# Patient Record
Sex: MALE | Race: WHITE | NOT HISPANIC OR LATINO | Employment: UNEMPLOYED | URBAN - METROPOLITAN AREA
[De-identification: names, ages, dates, MRNs, and addresses within clinical notes are randomized per-mention and may not be internally consistent; named-entity substitution may affect disease eponyms.]

---

## 2017-10-28 ENCOUNTER — HOSPITAL ENCOUNTER (EMERGENCY)
Facility: MEDICAL CENTER | Age: 57
End: 2017-10-28
Attending: EMERGENCY MEDICINE
Payer: MEDICAID

## 2017-10-28 ENCOUNTER — APPOINTMENT (OUTPATIENT)
Dept: RADIOLOGY | Facility: MEDICAL CENTER | Age: 57
End: 2017-10-28
Attending: EMERGENCY MEDICINE
Payer: MEDICAID

## 2017-10-28 VITALS
HEART RATE: 66 BPM | DIASTOLIC BLOOD PRESSURE: 101 MMHG | WEIGHT: 244.49 LBS | HEIGHT: 75 IN | RESPIRATION RATE: 20 BRPM | OXYGEN SATURATION: 93 % | SYSTOLIC BLOOD PRESSURE: 121 MMHG | BODY MASS INDEX: 30.4 KG/M2 | TEMPERATURE: 98.9 F

## 2017-10-28 DIAGNOSIS — R10.32 LEFT INGUINAL PAIN: ICD-10-CM

## 2017-10-28 DIAGNOSIS — R10.9 FLANK PAIN: ICD-10-CM

## 2017-10-28 LAB
ALBUMIN SERPL BCP-MCNC: 4.1 G/DL (ref 3.2–4.9)
ALBUMIN/GLOB SERPL: 1.3 G/DL
ALP SERPL-CCNC: 117 U/L (ref 30–99)
ALT SERPL-CCNC: 30 U/L (ref 2–50)
ANION GAP SERPL CALC-SCNC: 6 MMOL/L (ref 0–11.9)
APPEARANCE UR: CLEAR
AST SERPL-CCNC: 34 U/L (ref 12–45)
BACTERIA #/AREA URNS HPF: NEGATIVE /HPF
BASOPHILS # BLD AUTO: 1.1 % (ref 0–1.8)
BASOPHILS # BLD: 0.06 K/UL (ref 0–0.12)
BILIRUB SERPL-MCNC: 0.4 MG/DL (ref 0.1–1.5)
BILIRUB UR QL STRIP.AUTO: NEGATIVE
BUN SERPL-MCNC: 13 MG/DL (ref 8–22)
CALCIUM SERPL-MCNC: 9.1 MG/DL (ref 8.5–10.5)
CHLORIDE SERPL-SCNC: 105 MMOL/L (ref 96–112)
CO2 SERPL-SCNC: 24 MMOL/L (ref 20–33)
COLOR UR: YELLOW
CREAT SERPL-MCNC: 0.93 MG/DL (ref 0.5–1.4)
CULTURE IF INDICATED INDCX: NO UA CULTURE
EOSINOPHIL # BLD AUTO: 0.18 K/UL (ref 0–0.51)
EOSINOPHIL NFR BLD: 3.4 % (ref 0–6.9)
EPI CELLS #/AREA URNS HPF: NEGATIVE /HPF
ERYTHROCYTE [DISTWIDTH] IN BLOOD BY AUTOMATED COUNT: 44.5 FL (ref 35.9–50)
GFR SERPL CREATININE-BSD FRML MDRD: >60 ML/MIN/1.73 M 2
GLOBULIN SER CALC-MCNC: 3.1 G/DL (ref 1.9–3.5)
GLUCOSE SERPL-MCNC: 124 MG/DL (ref 65–99)
GLUCOSE UR STRIP.AUTO-MCNC: NEGATIVE MG/DL
HCT VFR BLD AUTO: 49.2 % (ref 42–52)
HGB BLD-MCNC: 16.8 G/DL (ref 14–18)
HYALINE CASTS #/AREA URNS LPF: ABNORMAL /LPF
IMM GRANULOCYTES # BLD AUTO: 0.02 K/UL (ref 0–0.11)
IMM GRANULOCYTES NFR BLD AUTO: 0.4 % (ref 0–0.9)
KETONES UR STRIP.AUTO-MCNC: NEGATIVE MG/DL
LEUKOCYTE ESTERASE UR QL STRIP.AUTO: NEGATIVE
LIPASE SERPL-CCNC: 26 U/L (ref 11–82)
LYMPHOCYTES # BLD AUTO: 1.84 K/UL (ref 1–4.8)
LYMPHOCYTES NFR BLD: 34.4 % (ref 22–41)
MCH RBC QN AUTO: 32.2 PG (ref 27–33)
MCHC RBC AUTO-ENTMCNC: 34.1 G/DL (ref 33.7–35.3)
MCV RBC AUTO: 94.3 FL (ref 81.4–97.8)
MICRO URNS: ABNORMAL
MONOCYTES # BLD AUTO: 0.39 K/UL (ref 0–0.85)
MONOCYTES NFR BLD AUTO: 7.3 % (ref 0–13.4)
NEUTROPHILS # BLD AUTO: 2.86 K/UL (ref 1.82–7.42)
NEUTROPHILS NFR BLD: 53.4 % (ref 44–72)
NITRITE UR QL STRIP.AUTO: NEGATIVE
NRBC # BLD AUTO: 0 K/UL
NRBC BLD AUTO-RTO: 0 /100 WBC
PH UR STRIP.AUTO: 7 [PH]
PLATELET # BLD AUTO: 186 K/UL (ref 164–446)
PMV BLD AUTO: 10.2 FL (ref 9–12.9)
POTASSIUM SERPL-SCNC: 4 MMOL/L (ref 3.6–5.5)
PROT SERPL-MCNC: 7.2 G/DL (ref 6–8.2)
PROT UR QL STRIP: NEGATIVE MG/DL
RBC # BLD AUTO: 5.22 M/UL (ref 4.7–6.1)
RBC # URNS HPF: ABNORMAL /HPF
RBC UR QL AUTO: ABNORMAL
SODIUM SERPL-SCNC: 135 MMOL/L (ref 135–145)
SP GR UR STRIP.AUTO: 1.02
UROBILINOGEN UR STRIP.AUTO-MCNC: 1 MG/DL
WBC # BLD AUTO: 5.4 K/UL (ref 4.8–10.8)
WBC #/AREA URNS HPF: ABNORMAL /HPF

## 2017-10-28 PROCEDURE — 74176 CT ABD & PELVIS W/O CONTRAST: CPT

## 2017-10-28 PROCEDURE — 700105 HCHG RX REV CODE 258: Performed by: EMERGENCY MEDICINE

## 2017-10-28 PROCEDURE — 83690 ASSAY OF LIPASE: CPT

## 2017-10-28 PROCEDURE — 99284 EMERGENCY DEPT VISIT MOD MDM: CPT

## 2017-10-28 PROCEDURE — 700111 HCHG RX REV CODE 636 W/ 250 OVERRIDE (IP): Performed by: EMERGENCY MEDICINE

## 2017-10-28 PROCEDURE — 80053 COMPREHEN METABOLIC PANEL: CPT

## 2017-10-28 PROCEDURE — 96375 TX/PRO/DX INJ NEW DRUG ADDON: CPT

## 2017-10-28 PROCEDURE — 81001 URINALYSIS AUTO W/SCOPE: CPT

## 2017-10-28 PROCEDURE — 96374 THER/PROPH/DIAG INJ IV PUSH: CPT

## 2017-10-28 PROCEDURE — 85025 COMPLETE CBC W/AUTO DIFF WBC: CPT

## 2017-10-28 RX ORDER — HYDROMORPHONE HYDROCHLORIDE 2 MG/ML
1 INJECTION, SOLUTION INTRAMUSCULAR; INTRAVENOUS; SUBCUTANEOUS
Status: DISCONTINUED | OUTPATIENT
Start: 2017-10-28 | End: 2017-10-28 | Stop reason: HOSPADM

## 2017-10-28 RX ORDER — ONDANSETRON 4 MG/1
4 TABLET, ORALLY DISINTEGRATING ORAL EVERY 8 HOURS PRN
Qty: 10 TAB | Refills: 0 | Status: SHIPPED | OUTPATIENT
Start: 2017-10-28 | End: 2019-05-03 | Stop reason: CLARIF

## 2017-10-28 RX ORDER — HYDROCODONE BITARTRATE AND ACETAMINOPHEN 5; 325 MG/1; MG/1
1-2 TABLET ORAL EVERY 6 HOURS PRN
Qty: 20 TAB | Refills: 0 | Status: SHIPPED | OUTPATIENT
Start: 2017-10-28 | End: 2019-05-03 | Stop reason: CLARIF

## 2017-10-28 RX ORDER — ONDANSETRON 2 MG/ML
4 INJECTION INTRAMUSCULAR; INTRAVENOUS ONCE
Status: COMPLETED | OUTPATIENT
Start: 2017-10-28 | End: 2017-10-28

## 2017-10-28 RX ORDER — SODIUM CHLORIDE 9 MG/ML
1000 INJECTION, SOLUTION INTRAVENOUS ONCE
Status: COMPLETED | OUTPATIENT
Start: 2017-10-28 | End: 2017-10-28

## 2017-10-28 RX ADMIN — SODIUM CHLORIDE 1000 ML: 9 INJECTION, SOLUTION INTRAVENOUS at 08:58

## 2017-10-28 RX ADMIN — ONDANSETRON HYDROCHLORIDE 4 MG: 2 INJECTION, SOLUTION INTRAMUSCULAR; INTRAVENOUS at 08:58

## 2017-10-28 RX ADMIN — HYDROMORPHONE HYDROCHLORIDE 1 MG: 2 INJECTION INTRAMUSCULAR; INTRAVENOUS; SUBCUTANEOUS at 08:58

## 2017-10-28 ASSESSMENT — PAIN SCALES - GENERAL
PAINLEVEL_OUTOF10: 7
PAINLEVEL_OUTOF10: 4

## 2017-10-28 NOTE — ED NOTES
".  Chief Complaint   Patient presents with   • Back Pain     x 2 days   • Hip Pain     left hip radiates to groin. onset last night   • Urinary Frequency     x 1 day     Ambulated to triage pt reporting 10/10 pain to left hip groin area radiates to scrotum \"like I got kicked there\". Denies blood in urine.  "

## 2017-10-28 NOTE — DISCHARGE INSTRUCTIONS
Please follow-up with your primary care provider for blood pressure management.    Return to the ER in 1-2 days for recheck unless improved. Return sooner if pain is worse, fevers, vomiting.          Abdominal Pain, Adult  Many things can cause abdominal pain. Usually, abdominal pain is not caused by a disease and will improve without treatment. It can often be observed and treated at home. Your health care provider will do a physical exam and possibly order blood tests and X-rays to help determine the seriousness of your pain. However, in many cases, more time must pass before a clear cause of the pain can be found. Before that point, your health care provider may not know if you need more testing or further treatment.  HOME CARE INSTRUCTIONS   Monitor your abdominal pain for any changes. The following actions may help to alleviate any discomfort you are experiencing:  · Only take over-the-counter or prescription medicines as directed by your health care provider.  · Do not take laxatives unless directed to do so by your health care provider.  · Try a clear liquid diet (broth, tea, or water) as directed by your health care provider. Slowly move to a bland diet as tolerated.  SEEK MEDICAL CARE IF:  · You have unexplained abdominal pain.  · You have abdominal pain associated with nausea or diarrhea.  · You have pain when you urinate or have a bowel movement.  · You experience abdominal pain that wakes you in the night.  · You have abdominal pain that is worsened or improved by eating food.  · You have abdominal pain that is worsened with eating fatty foods.  · You have a fever.  SEEK IMMEDIATE MEDICAL CARE IF:   · Your pain does not go away within 2 hours.  · You keep throwing up (vomiting).  · Your pain is felt only in portions of the abdomen, such as the right side or the left lower portion of the abdomen.  · You pass bloody or black tarry stools.  MAKE SURE YOU:  · Understand these instructions.    · Will watch  your condition.    · Will get help right away if you are not doing well or get worse.       This information is not intended to replace advice given to you by your health care provider. Make sure you discuss any questions you have with your health care provider.     Document Released: 09/27/2006 Document Revised: 01/08/2016 Document Reviewed: 08/27/2014  [employer] Interactive Patient Education ©2016 [employer]

## 2017-10-28 NOTE — ED PROVIDER NOTES
"ED Provider Note    Scribed for Álvaro Bermudze M.D. by Tiffanie Linder. 10/28/2017  8:30 AM    Primary care provider: Pcp Pt States None  Means of arrival: Walk-In  History obtained from: Patient  History limited by: None    CHIEF COMPLAINT  Chief Complaint   Patient presents with   • Back Pain     x 2 days   • Hip Pain     left hip radiates to groin. onset last night   • Urinary Frequency     x 1 day       HPI  Timothy Dahl is a 56 y.o. male who presents to the Emergency Department complaining of constant back pain onset couple of days and progressively worsening left hip pain that radiates to his groin onset Saturday night. He describes this pain as feeling like he got \"kicked in the nuts.\" Per patient, when he woke up the other day it felt like he pulled his back. He had his boss crack his back without relief. Denies testicular swelling, nausea, hematuria, emesis. Denies dysuria however states \"it is an effort.\" Denies a past medical history of kidney stones, hernias.       REVIEW OF SYSTEMS  Pertinent positives include back pain, hip pain, strain with urination. Pertinent negatives include no dysuria, hematuria, testicular swelling, nausea, emesis.      PAST MEDICAL HISTORY    No past medical history on file.    SURGICAL HISTORY   has a past surgical history that includes other orthopedic surgery.    SOCIAL HISTORY  Social History   Substance Use Topics   • Smoking status: Current Every Day Smoker   • Alcohol use No      History   Drug Use No       FAMILY HISTORY  No family history on file.    CURRENT MEDICATIONS  No current facility-administered medications on file prior to encounter.      Current Outpatient Prescriptions on File Prior to Encounter   Medication Sig Dispense Refill   • methylPREDNISolone (MEDROL) 4 MG TABS 6 day course 21 Each 0       ALLERGIES  No Known Allergies    PHYSICAL EXAM  VITAL SIGNS: /101   Pulse 66   Temp 37.2 °C (98.9 °F)   Resp 20   Ht 1.905 m (6' 3\")   Wt 110.9 kg " (244 lb 7.8 oz)   SpO2 93%   BMI 30.56 kg/m²     Constitutional: Well developed, Well nourished, moderate distress, Non-toxic appearance.   HENT: Normocephalic, Atraumatic, Bilateral external ears normal, Oropharynx moist, No oral exudates.   Eyes: PERRLA, EOMI, Conjunctiva normal, No discharge.   Neck: No tenderness, Supple, No stridor.   Lymphatic: No lymphadenopathy noted.   Cardiovascular: Normal heart rate, Normal rhythm.   Thorax & Lungs: Clear to auscultation bilaterally, No respiratory distress, No wheezing, No crackles.   Abdomen: Soft, No tenderness, No masses, No pulsatile masses.   Back: Non tender.   : No palpable hernias. No soft tissue swelling or masses.   Skin: Warm, Dry, No erythema, No rash.   Extremities:, No edema No cyanosis.   Musculoskeletal: No tenderness to palpation or major deformities noted.  Intact distal pulses  Neurologic: Awake, alert. Moves all extremities spontaneously.  Psychiatric: Affect normal, Judgment normal, Mood normal.       LABS  Labs Reviewed   COMP METABOLIC PANEL - Abnormal; Notable for the following:        Result Value    Glucose 124 (*)     Alkaline Phosphatase 117 (*)     All other components within normal limits   URINALYSIS,CULTURE IF INDICATED - Abnormal; Notable for the following:     Occult Blood Trace (*)     All other components within normal limits   URINE MICROSCOPIC (W/UA) - Abnormal; Notable for the following:     WBC 0-2 (*)     RBC 5-10 (*)     All other components within normal limits   CBC WITH DIFFERENTIAL   LIPASE   ESTIMATED GFR     All labs reviewed by me.    RADIOLOGY  CT-RENAL COLIC EVALUATION(A/P W/O)   Final Result      No CT evidence of urolithiasis or hydronephrosis      Medium length segmental loop of mid small bowel is mildly dilated, 3.7 cm, without wall thickening, abrupt transition point or other explanation for dilatation. Given the mild dilatation and lack of adjacent fat stranding this may be of no significance    although  partial obstruction is not excluded      Colonic diverticulosis without CT evidence for diverticulitis      Bilateral renal cysts      Hepatic steatosis        The radiologist's interpretation of all radiological studies have been reviewed by me.    COURSE & MEDICAL DECISION MAKING  Nursing notes, SKY FERNANDEZHx reviewed in chart.    8:30 AM - Patient seen and examined at bedside. I suspect patient's symptoms are due to a kidney stone and will order CT-renal to rule out.  Patient will be treated with IV fluids, Zofran 4 mg, Dilaudid 1 mg. Ordered CT-renal, urinalysis culture, CBC with differential, CMP, lipase to evaluate his symptoms. Differentials include but are not limited to: kidney stones, inguinal hernia, UTI.    11:27 AM - Recheck. Updated patient on lab and radiology results which indicated a small amount of blood in his urine. Patient reports that his pain is still present however the medication has allowed him to rest some. This could be a possible muscle sprain or kidney stone. He will be discharged with pain medications. I advised him on narcotic use and he verbalized his understanding. Patient was given ED return precautions and discharged at this time.     The patient was informed of their blood pressure exceeding 120/80 and I have asked them to follow up with their primary care physician to discuss further monitoring and possible treatment.     Decision Making:  Patient with left groin pain of uncertain etiology could be secondary to hip strain and muscle scale the pain versus passed kidney stone. CT scan of laboratory tests are unremarkable, the patient is not having any back pain. I discussed with him at this point in time I do not know the etiology of his pain level to the patient with pain medicines, anti-inflammatories, have the patient return with increasing pain fevers vomiting or any other concerns.    I reviewed prescription monitoring program for patient's narcotic use before prescribing a  scheduled drug.The patient will not drink alcohol nor drive with prescribed medications. The patient will return for new or worsening symptoms and is stable at the time of discharge.    The patient is referred to a primary physician for blood pressure management, diabetic screening, and for all other preventative health concerns.    DISPOSITION:  Patient will be discharged home in stable condition.    FOLLOW UP:  [facility], Emergency Dept  [address]  [place] [address] [phone]    If symptoms worsen, In 1-2 days for recheck if not improved.      OUTPATIENT MEDICATIONS:  Discharge Medication List as of 10/28/2017 11:35 AM      START taking these medications    Details   hydrocodone-acetaminophen (NORCO) 5-325 MG Tab per tablet Take 1-2 Tabs by mouth every 6 hours as needed., Disp-20 Tab, R-0, Print Rx Paper      ondansetron (ZOFRAN ODT) 4 MG TABLET DISPERSIBLE Take 1 Tab by mouth every 8 hours as needed., Disp-10 Tab, R-0, Print Rx Paper               FINAL IMPRESSION  1. Flank pain    2. Left inguinal pain          Tiffanie NELSON (Ta), am scribing for, and in the presence of, Álvaro Bermudez M.D..    Electronically signed by: Tiffanie Linder (Ta), 10/28/2017    Álvaro NELSON M.D. personally performed the services described in this documentation, as scribed by Tiffanie Linder in my presence, and it is both accurate and complete.    The note accurately reflects work and decisions made by me.  Álvaro Bermudez  10/28/2017  1:44 PM

## 2019-05-03 ENCOUNTER — HOSPITAL ENCOUNTER (EMERGENCY)
Facility: MEDICAL CENTER | Age: 59
End: 2019-05-03
Attending: EMERGENCY MEDICINE
Payer: MEDICAID

## 2019-05-03 VITALS
SYSTOLIC BLOOD PRESSURE: 142 MMHG | WEIGHT: 244.93 LBS | HEIGHT: 75 IN | HEART RATE: 97 BPM | OXYGEN SATURATION: 95 % | BODY MASS INDEX: 30.45 KG/M2 | DIASTOLIC BLOOD PRESSURE: 86 MMHG | RESPIRATION RATE: 16 BRPM | TEMPERATURE: 96.9 F

## 2019-05-03 DIAGNOSIS — T14.8XXA WOUND INFECTION: ICD-10-CM

## 2019-05-03 DIAGNOSIS — L08.9 WOUND INFECTION: ICD-10-CM

## 2019-05-03 PROCEDURE — 700102 HCHG RX REV CODE 250 W/ 637 OVERRIDE(OP): Performed by: EMERGENCY MEDICINE

## 2019-05-03 PROCEDURE — 99284 EMERGENCY DEPT VISIT MOD MDM: CPT

## 2019-05-03 PROCEDURE — A9270 NON-COVERED ITEM OR SERVICE: HCPCS | Performed by: EMERGENCY MEDICINE

## 2019-05-03 PROCEDURE — 700101 HCHG RX REV CODE 250: Performed by: EMERGENCY MEDICINE

## 2019-05-03 RX ORDER — SULFAMETHOXAZOLE AND TRIMETHOPRIM 800; 160 MG/1; MG/1
1 TABLET ORAL 2 TIMES DAILY
Qty: 20 TAB | Refills: 0 | Status: SHIPPED | OUTPATIENT
Start: 2019-05-03 | End: 2019-05-13

## 2019-05-03 RX ORDER — CEPHALEXIN 500 MG/1
500 CAPSULE ORAL 4 TIMES DAILY
Qty: 40 CAP | Refills: 0 | Status: SHIPPED | OUTPATIENT
Start: 2019-05-03 | End: 2019-05-13

## 2019-05-03 RX ORDER — SULFAMETHOXAZOLE AND TRIMETHOPRIM 800; 160 MG/1; MG/1
1 TABLET ORAL ONCE
Status: COMPLETED | OUTPATIENT
Start: 2019-05-03 | End: 2019-05-03

## 2019-05-03 RX ORDER — CEPHALEXIN 500 MG/1
500 CAPSULE ORAL ONCE
Status: COMPLETED | OUTPATIENT
Start: 2019-05-03 | End: 2019-05-03

## 2019-05-03 RX ADMIN — SULFAMETHOXAZOLE AND TRIMETHOPRIM 1 TABLET: 800; 160 TABLET ORAL at 09:45

## 2019-05-03 RX ADMIN — CEPHALEXIN 500 MG: 500 CAPSULE ORAL at 09:45

## 2019-05-03 RX ADMIN — MUPIROCIN 1 APPLICATION: 20 OINTMENT TOPICAL at 10:31

## 2019-05-03 NOTE — ED PROVIDER NOTES
"ED Provider Note      CHIEF COMPLAINT   Chief Complaint   Patient presents with   • Leg Pain     left leg - chronic - 2 yrs ago piece of cable stuck in leg - infected 2 wks ago leg hit his bike pedal and pain started with a sore; h/o staph infection.       HPI   Timothy Dahl is a 58 y.o. male who presents with an infected wound on the left leg.  Patient has chronic left lower extremity pain anteriorly for the last 2 years since he poked himself with a piece of cable.  He developed an infection after this original event.  This infection resolved, but he has persistent discomfort.  He scraped his anterior leg on a pedal a few days ago.  He had an abrasion with some skin irritation and redness.  He began applying Neosporin.  He washes his wound twice daily.  He has had progressive skin breakdown and redness with throbbing discomfort.  Mild pain.  He has not had a fever.  No blunt trauma.  He still ambulatory.    REVIEW OF SYSTEMS   Pertinent negative: As above.  No fever, chest pain, shortness of breath, cough    PAST MEDICAL HISTORY   Denies diabetes or immunocompromise    SOCIAL HISTORY  Social History   Substance Use Topics   • Smoking status: Current Every Day Smoker     Packs/day: 0.50     Types: Cigarettes   • Smokeless tobacco: Never Used   • Alcohol use No       ALLERGIES   See chart    PHYSICAL EXAM  VITAL SIGNS: /86   Pulse 97   Temp 36.1 °C (96.9 °F) (Temporal)   Resp 16   Ht 1.905 m (6' 3\")   Wt 111.1 kg (244 lb 14.9 oz)   SpO2 95%   BMI 30.61 kg/m²   Head: Atraumatic  Eyes: Eyes normal inspection  Neck: has full range of motion, normal inspection.  Constitutional: No acute distress   Cardiovascular: Normal heart rate. Pulses strong dorsalis pedis  Thorax & Lungs: No respiratory distress  Skin: Significant skin breakdown over the anterior left lower leg with mild surrounding redness.  Area of skin breakdown consists of a weeping ulcer with surrounding necrotic tissue.  There is no " crepitus.  No fluctuant abscess.  Fluid drainage is mildly cloudy  Musculoskeletal: As described above.  No focal bony tenderness compartments soft.  Neurologic:  Normal sensory and motor        COURSE & MEDICAL DECISION MAKING  Patient presents with a wound infection of the left anterior lower leg.  His tetanus is up-to-date.  He does not have evidence of necrotizing fasciitis, fracture or deep space infection.  He does not have any evidence or suggestion of an abscess.  His vital signs are stable.  He is appropriate for outpatient management.  May be a component of allergic dermatitis related to Neosporin.  Neosporin will be discontinued.  I have given mupirocin in the ER.  He will be initiated on both Bactrim and Keflex because of skin and soft tissue infection with associated potential purulent exudate.  Is given advice regarding local wound care.  He will return to the ER for worsening, not improving, pain, fevers or concern.  Mandatory recheck with the primary provider within 1 week.  He was referred to the [facility] clinic.    Patient to have blood pressure recheck and routine health maintenance with primary provider.    FINAL IMPRESSION  1.  Purulent cellulitis   2.  wound infection  3.  Questionable Neosporin reaction      This dictation was created using voice recognition software. The accuracy of the dictation is limited to the abilities of the software. I expect there may be some errors of grammar and possibly content. The nursing notes were reviewed and certain aspects of this information were incorporated into this note.      Electronically signed by: Timothy Garcia, 5/3/2019 9:44 AM

## 2019-05-03 NOTE — ED NOTES
Pt walked back to room 13 with this RN with steady gait. Agree with triage note. Pt getting undressed and in gown.

## 2019-05-03 NOTE — ED TRIAGE NOTES
Pt ambulated to triage with   Chief Complaint   Patient presents with   • Leg Pain     left leg - chronic - 2 yrs ago piece of cable stuck in leg - infected 2 wks ago leg hit his bike pedal and pain started with a sore; h/o staph infection.     Pt Informed regarding triage process and verbalized understanding to inform triage tech or RN for any changes in condition. Placed in lobby.

## 2019-08-14 ENCOUNTER — HOSPITAL ENCOUNTER (EMERGENCY)
Facility: MEDICAL CENTER | Age: 59
End: 2019-08-14
Payer: MEDICAID

## 2019-08-14 VITALS
TEMPERATURE: 98.1 F | SYSTOLIC BLOOD PRESSURE: 155 MMHG | BODY MASS INDEX: 31.52 KG/M2 | RESPIRATION RATE: 18 BRPM | HEIGHT: 75 IN | OXYGEN SATURATION: 95 % | HEART RATE: 105 BPM | DIASTOLIC BLOOD PRESSURE: 93 MMHG | WEIGHT: 253.53 LBS

## 2019-08-14 PROCEDURE — 302449 STATCHG TRIAGE ONLY (STATISTIC)

## 2019-08-15 NOTE — ED TRIAGE NOTES
Chief Complaint   Patient presents with   • Other     left leg edema with pain     Symptoms X 2 days.  Denies recent trauma.  Triage process explained to patient.  Pt back to waiting room.  Pt instructed to inform RN if any changes or questions arise.

## 2020-05-24 ENCOUNTER — HOSPITAL ENCOUNTER (EMERGENCY)
Facility: MEDICAL CENTER | Age: 60
End: 2020-05-24
Attending: EMERGENCY MEDICINE

## 2020-05-24 VITALS
TEMPERATURE: 97.2 F | BODY MASS INDEX: 32.18 KG/M2 | RESPIRATION RATE: 16 BRPM | HEART RATE: 67 BPM | OXYGEN SATURATION: 91 % | HEIGHT: 75 IN | SYSTOLIC BLOOD PRESSURE: 139 MMHG | DIASTOLIC BLOOD PRESSURE: 78 MMHG | WEIGHT: 258.82 LBS

## 2020-05-24 DIAGNOSIS — T14.8XXA WOUND INFECTION: ICD-10-CM

## 2020-05-24 DIAGNOSIS — L03.115 CELLULITIS OF RIGHT LOWER EXTREMITY: ICD-10-CM

## 2020-05-24 DIAGNOSIS — L08.9 WOUND INFECTION: ICD-10-CM

## 2020-05-24 LAB
ALBUMIN SERPL BCP-MCNC: 4 G/DL (ref 3.2–4.9)
ALBUMIN/GLOB SERPL: 1.1 G/DL
ALP SERPL-CCNC: 132 U/L (ref 30–99)
ALT SERPL-CCNC: 31 U/L (ref 2–50)
ANION GAP SERPL CALC-SCNC: 9 MMOL/L (ref 7–16)
AST SERPL-CCNC: 24 U/L (ref 12–45)
BASOPHILS # BLD AUTO: 0.5 % (ref 0–1.8)
BASOPHILS # BLD: 0.04 K/UL (ref 0–0.12)
BILIRUB SERPL-MCNC: 0.3 MG/DL (ref 0.1–1.5)
BUN SERPL-MCNC: 20 MG/DL (ref 8–22)
CALCIUM SERPL-MCNC: 9.2 MG/DL (ref 8.5–10.5)
CHLORIDE SERPL-SCNC: 103 MMOL/L (ref 96–112)
CO2 SERPL-SCNC: 25 MMOL/L (ref 20–33)
CREAT SERPL-MCNC: 1.15 MG/DL (ref 0.5–1.4)
EOSINOPHIL # BLD AUTO: 0.12 K/UL (ref 0–0.51)
EOSINOPHIL NFR BLD: 1.5 % (ref 0–6.9)
ERYTHROCYTE [DISTWIDTH] IN BLOOD BY AUTOMATED COUNT: 47.8 FL (ref 35.9–50)
GLOBULIN SER CALC-MCNC: 3.7 G/DL (ref 1.9–3.5)
GLUCOSE SERPL-MCNC: 124 MG/DL (ref 65–99)
HCT VFR BLD AUTO: 52.1 % (ref 42–52)
HGB BLD-MCNC: 17 G/DL (ref 14–18)
IMM GRANULOCYTES # BLD AUTO: 0.03 K/UL (ref 0–0.11)
IMM GRANULOCYTES NFR BLD AUTO: 0.4 % (ref 0–0.9)
LYMPHOCYTES # BLD AUTO: 2.18 K/UL (ref 1–4.8)
LYMPHOCYTES NFR BLD: 26.9 % (ref 22–41)
MCH RBC QN AUTO: 31.5 PG (ref 27–33)
MCHC RBC AUTO-ENTMCNC: 32.6 G/DL (ref 33.7–35.3)
MCV RBC AUTO: 96.7 FL (ref 81.4–97.8)
MONOCYTES # BLD AUTO: 0.56 K/UL (ref 0–0.85)
MONOCYTES NFR BLD AUTO: 6.9 % (ref 0–13.4)
NEUTROPHILS # BLD AUTO: 5.18 K/UL (ref 1.82–7.42)
NEUTROPHILS NFR BLD: 63.8 % (ref 44–72)
NRBC # BLD AUTO: 0 K/UL
NRBC BLD-RTO: 0 /100 WBC
PLATELET # BLD AUTO: 226 K/UL (ref 164–446)
PMV BLD AUTO: 10.2 FL (ref 9–12.9)
POTASSIUM SERPL-SCNC: 4.2 MMOL/L (ref 3.6–5.5)
PROT SERPL-MCNC: 7.7 G/DL (ref 6–8.2)
RBC # BLD AUTO: 5.39 M/UL (ref 4.7–6.1)
SODIUM SERPL-SCNC: 137 MMOL/L (ref 135–145)
WBC # BLD AUTO: 8.1 K/UL (ref 4.8–10.8)

## 2020-05-24 PROCEDURE — 36415 COLL VENOUS BLD VENIPUNCTURE: CPT

## 2020-05-24 PROCEDURE — 99284 EMERGENCY DEPT VISIT MOD MDM: CPT

## 2020-05-24 PROCEDURE — 96365 THER/PROPH/DIAG IV INF INIT: CPT

## 2020-05-24 PROCEDURE — 700101 HCHG RX REV CODE 250: Performed by: EMERGENCY MEDICINE

## 2020-05-24 PROCEDURE — 87040 BLOOD CULTURE FOR BACTERIA: CPT

## 2020-05-24 PROCEDURE — 80053 COMPREHEN METABOLIC PANEL: CPT

## 2020-05-24 PROCEDURE — 85025 COMPLETE CBC W/AUTO DIFF WBC: CPT

## 2020-05-24 RX ORDER — CLINDAMYCIN PHOSPHATE 600 MG/50ML
600 INJECTION, SOLUTION INTRAVENOUS ONCE
Status: COMPLETED | OUTPATIENT
Start: 2020-05-24 | End: 2020-05-24

## 2020-05-24 RX ORDER — CLINDAMYCIN HYDROCHLORIDE 300 MG/1
300 CAPSULE ORAL 3 TIMES DAILY
Qty: 30 CAP | Refills: 0 | Status: SHIPPED | OUTPATIENT
Start: 2020-05-24 | End: 2020-06-03

## 2020-05-24 RX ADMIN — CLINDAMYCIN IN 5 PERCENT DEXTROSE 600 MG: 12 INJECTION, SOLUTION INTRAVENOUS at 21:43

## 2020-05-24 ASSESSMENT — ENCOUNTER SYMPTOMS
WEAKNESS: 0
SENSORY CHANGE: 0
FEVER: 0
CHILLS: 0

## 2020-05-25 NOTE — ED TRIAGE NOTES
Timothy Dahl  59 y.o. male  Chief Complaint   Patient presents with   • Wound Check     Pt presents to the ED with complaints of wounds to the lower right leg x 1 week. Pt denies fever, N/V.        Pt amb to triage with steady gait for above complaint.   Pt is alert and oriented, speaking in full sentences, follows commands and responds appropriately to questions. Resp are even and unlabored. No behavioral indicators of pain.   Pt placed in lobby. Pt educated on triage process. Pt encouraged to alert staff for any changes.

## 2020-05-25 NOTE — ED NOTES
Patient ambulatory to room Yellow [address]. Patient changed into gown and placed on monitor. RN aware. Chart up for ERP.

## 2020-05-25 NOTE — ED PROVIDER NOTES
ED Provider Note    Scribed for Lu Ro M.D. by Srikanth Logan. 5/24/2020, 9:03 PM.    Means of arrival: Walk-in  History obtained from: Patient  History limited by: None    CHIEF COMPLAINT  Chief Complaint   Patient presents with   • Wound Check     Pt presents to the ED with complaints of wounds to the lower right leg x 1 week. Pt denies fever, N/V.        HPI  Timothy Dahl is a 59 y.o. male who presents to the Emergency Department complaining of a several non healing lesions right lower leg that he first noticed five days ago after he injured his leg while working on his friend's truck.  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  He denies fever, chills, weakness, or sensory change. The patient denies any formal diagnosis of diabetes mellitus, but one month ago his friend let him use his glucometer and patient reports he had a blood glucose in the 300s. He reports that several members of his extended family members had diabetes.  He has no known drug allergies.    PPE Note: I personally donned full PPE for all patient encounters during this visit, including an N95 respirator mask, gloves, and goggles.     Scribe remained outside the patient's room and did not have any contact with the patient for the duration of patient encounter.    REVIEW OF SYSTEMS  Review of Systems   Constitutional: Negative for chills and fever.   Cardiovascular: Negative for chest pain.   Skin:        Positive for lesions, central wound, drainage, irritation   Neurological: Negative for sensory change and weakness.   All other systems reviewed and are negative.    PAST MEDICAL HISTORY   No diabetes mellitus     SURGICAL HISTORY   has a past surgical history that includes other orthopedic surgery.    SOCIAL HISTORY  Social History     Tobacco Use   • Smoking status: Current Every Day Smoker     Packs/day: 0.50     Types: Cigarettes   • Smokeless tobacco: Never Used   Substance Use  "Topics   • Alcohol use: No   • Drug use: No      Social History     Substance and Sexual Activity   Drug Use No       FAMILY HISTORY  History reviewed. No pertinent family history.    CURRENT MEDICATIONS  Home Medications     Reviewed by Lu Baez R.N. (Registered Nurse) on 05/24/20 at 1951  Med List Status: Complete   Medication Last Dose Status        Patient Fish Taking any Medications                       ALLERGIES  No Known Allergies    PHYSICAL EXAM  VITAL SIGNS: /79   Pulse 84   Temp 36.1 °C (97 °F) (Temporal)   Resp 19   Ht 1.905 m (6' 3\")   Wt 117.4 kg (258 lb 13.1 oz)   SpO2 (!) 86%   BMI 32.35 kg/m²   Vitals reviewed by myself.  Physical Exam  Nursing note and vitals reviewed.  Constitutional: Well-developed and well-nourished. No acute distress. Obese  HENT: Head is normocephalic and atraumatic.  Eyes: extra-ocular movements intact  Cardiovascular: Normal rate and regular rhythm. No murmur heard.  Pulmonary/Chest: Breath sounds normal. No wheezes or rales.   Musculoskeletal: Extremities exhibit normal range of motion without edema or tenderness.   Neurological: Awake and alert  Skin: multiple small wounds with surrounding erythema on the right lower extremity.  No purulent discharge is noted from any of the wounds.  No fluctuance or induration    DIAGNOSTIC STUDIES /  LABS  Labs Reviewed   CBC WITH DIFFERENTIAL - Abnormal; Notable for the following components:       Result Value    Hematocrit 52.1 (*)     MCHC 32.6 (*)     All other components within normal limits   COMP METABOLIC PANEL - Abnormal; Notable for the following components:    Glucose 124 (*)     Alkaline Phosphatase 132 (*)     Globulin 3.7 (*)     All other components within normal limits   BLOOD CULTURE    Narrative:     Per Hospital Policy: Only change Specimen Src: to \"Line\" if  specified by physician order.   BLOOD CULTURE    Narrative:     Per Hospital Policy: Only change Specimen Src: to \"Line\" if  specified by " physician order.   ESTIMATED GFR       All labs reviewed by me.    REASSESSMENT  10:57 PM Patient seen and examined at bedside. Discussed plan of care, including the need for antibiotics and basic labs. Patient agrees to the plan of care.    10:57 PM - Re-examined; The patient is resting in bed. I discussed his above findings and plans for discharge with a prescription for Cleocin. He was given a referral to [facility], [facility], and [facility] to establish a primary care and instructed to return to the ED if his symptoms worsen. Patient understands and agrees.    COURSE & MEDICAL DECISION MAKING  Nursing notes, VS, PMSFHx reviewed in chart.    Patient is a 59-year-old male who comes in for evaluation of wounds to his right lower extremity.  Differential diagnosis includes cellulitis, new onset diabetes, abscess.  Clinically there is no evidence of abscess, no fluctuance or induration noted on exam.  Patient has multiple wounds with surrounding erythema consistent with likely cellulitis.  Given concern for possible diabetes I will obtain baseline labs.    Patient's initial vitals are within normal limits and he is well-appearing on exam.  Labs returned and are unremarkable, nonfasting blood sugar is 124 which is not indicative of acute diabetes.  I advised patient that we cannot rule out diabetes but he does not appear to have uncontrolled diabetes and therefore he can follow-up with his primary care physician.  For his cellulitis he will be started on clindamycin.  First dose is given the emergency department.  Patient is agreeable to this plan.  He is then given strict return precautions and discharged in stable condition.    The patient will return for new or worsening symptoms and is stable at the time of discharge.    DISPOSITION:  Patient will be discharged home in stable condition.    FOLLOW UP:  [place] [address] [address] [facility] [address]  [phone]        [address]  [facility] [address]  [phone]        [address] [id number] [phone]        OUTPATIENT MEDICATIONS:  Discharge Medication List as of 5/24/2020 10:17 PM      START taking these medications    Details   clindamycin (CLEOCIN) 300 MG Cap Take 1 Cap by mouth 3 times a day for 10 days., Disp-30 Cap,R-0, Print Rx Paper             FINAL IMPRESSION  1. Wound infection    2. Cellulitis of right lower extremity          ISrikanth (Scribe), am scribing for, and in the presence of, Lu Ro M.D..    Electronically signed by: Srikanth Logan (Scribe), 5/24/2020    ILu M.D. personally performed the services described in this documentation, as scribed by Srikanth Logan in my presence, and it is both accurate and complete.    C    The note accurately reflects work and decisions made by me.  Lu Ro M.D.  5/25/2020  1:14 AM

## 2020-05-25 NOTE — ED NOTES
Patient discharged home per ERP.  Discharge teaching and education discussed with patient. POC discussed.   Patient verbalized understanding of discharge teaching and education. No other questions at this time.     RX x 1 given to patient.   PIV removed.     VSS. Patient alert and oriented. Patient arranged ride for self. Able to ambulate off unit safely with steady gait.

## 2020-05-30 LAB
BACTERIA BLD CULT: NORMAL
BACTERIA BLD CULT: NORMAL
SIGNIFICANT IND 70042: NORMAL
SIGNIFICANT IND 70042: NORMAL
SITE SITE: NORMAL
SITE SITE: NORMAL
SOURCE SOURCE: NORMAL
SOURCE SOURCE: NORMAL

## 2020-07-17 ENCOUNTER — APPOINTMENT (OUTPATIENT)
Dept: RADIOLOGY | Facility: MEDICAL CENTER | Age: 60
DRG: 872 | End: 2020-07-17

## 2020-07-17 ENCOUNTER — APPOINTMENT (OUTPATIENT)
Dept: RADIOLOGY | Facility: MEDICAL CENTER | Age: 60
DRG: 872 | End: 2020-07-17
Attending: EMERGENCY MEDICINE

## 2020-07-17 ENCOUNTER — HOSPITAL ENCOUNTER (INPATIENT)
Facility: MEDICAL CENTER | Age: 60
LOS: 3 days | DRG: 872 | End: 2020-07-21
Attending: EMERGENCY MEDICINE | Admitting: INTERNAL MEDICINE

## 2020-07-17 DIAGNOSIS — A41.9 SEPSIS, DUE TO UNSPECIFIED ORGANISM, UNSPECIFIED WHETHER ACUTE ORGAN DYSFUNCTION PRESENT (HCC): ICD-10-CM

## 2020-07-17 DIAGNOSIS — L03.115 CELLULITIS OF RIGHT LOWER EXTREMITY: ICD-10-CM

## 2020-07-17 DIAGNOSIS — T14.8XXA OPEN WOUND: ICD-10-CM

## 2020-07-17 LAB
ALBUMIN SERPL BCP-MCNC: 4.2 G/DL (ref 3.2–4.9)
ALBUMIN/GLOB SERPL: 1.1 G/DL
ALP SERPL-CCNC: 133 U/L (ref 30–99)
ALT SERPL-CCNC: 35 U/L (ref 2–50)
ANION GAP SERPL CALC-SCNC: 13 MMOL/L (ref 7–16)
APPEARANCE UR: CLEAR
AST SERPL-CCNC: 31 U/L (ref 12–45)
BACTERIA #/AREA URNS HPF: NEGATIVE /HPF
BASOPHILS # BLD AUTO: 0.5 % (ref 0–1.8)
BASOPHILS # BLD: 0.09 K/UL (ref 0–0.12)
BILIRUB SERPL-MCNC: 0.6 MG/DL (ref 0.1–1.5)
BILIRUB UR QL STRIP.AUTO: NEGATIVE
BUN SERPL-MCNC: 17 MG/DL (ref 8–22)
CALCIUM SERPL-MCNC: 9.5 MG/DL (ref 8.5–10.5)
CHLORIDE SERPL-SCNC: 97 MMOL/L (ref 96–112)
CO2 SERPL-SCNC: 20 MMOL/L (ref 20–33)
COLOR UR: YELLOW
CREAT SERPL-MCNC: 1.16 MG/DL (ref 0.5–1.4)
CRP SERPL HS-MCNC: 2.55 MG/DL (ref 0–0.75)
EOSINOPHIL # BLD AUTO: 0.02 K/UL (ref 0–0.51)
EOSINOPHIL NFR BLD: 0.1 % (ref 0–6.9)
EPI CELLS #/AREA URNS HPF: NEGATIVE /HPF
ERYTHROCYTE [DISTWIDTH] IN BLOOD BY AUTOMATED COUNT: 45.8 FL (ref 35.9–50)
GLOBULIN SER CALC-MCNC: 3.7 G/DL (ref 1.9–3.5)
GLUCOSE BLD-MCNC: 162 MG/DL (ref 65–99)
GLUCOSE SERPL-MCNC: 159 MG/DL (ref 65–99)
GLUCOSE UR STRIP.AUTO-MCNC: NEGATIVE MG/DL
HCT VFR BLD AUTO: 51.1 % (ref 42–52)
HGB BLD-MCNC: 17 G/DL (ref 14–18)
HYALINE CASTS #/AREA URNS LPF: ABNORMAL /LPF
IMM GRANULOCYTES # BLD AUTO: 0.17 K/UL (ref 0–0.11)
IMM GRANULOCYTES NFR BLD AUTO: 0.9 % (ref 0–0.9)
KETONES UR STRIP.AUTO-MCNC: NEGATIVE MG/DL
LACTATE BLD-SCNC: 2.1 MMOL/L (ref 0.5–2)
LACTATE BLD-SCNC: 2.6 MMOL/L (ref 0.5–2)
LEUKOCYTE ESTERASE UR QL STRIP.AUTO: NEGATIVE
LYMPHOCYTES # BLD AUTO: 1.33 K/UL (ref 1–4.8)
LYMPHOCYTES NFR BLD: 7.2 % (ref 22–41)
MCH RBC QN AUTO: 31 PG (ref 27–33)
MCHC RBC AUTO-ENTMCNC: 33.3 G/DL (ref 33.7–35.3)
MCV RBC AUTO: 93.2 FL (ref 81.4–97.8)
MICRO URNS: ABNORMAL
MONOCYTES # BLD AUTO: 0.99 K/UL (ref 0–0.85)
MONOCYTES NFR BLD AUTO: 5.3 % (ref 0–13.4)
NEUTROPHILS # BLD AUTO: 15.92 K/UL (ref 1.82–7.42)
NEUTROPHILS NFR BLD: 86 % (ref 44–72)
NITRITE UR QL STRIP.AUTO: NEGATIVE
NRBC # BLD AUTO: 0 K/UL
NRBC BLD-RTO: 0 /100 WBC
PH UR STRIP.AUTO: 5 [PH] (ref 5–8)
PLATELET # BLD AUTO: 205 K/UL (ref 164–446)
PMV BLD AUTO: 10.2 FL (ref 9–12.9)
POTASSIUM SERPL-SCNC: 4.3 MMOL/L (ref 3.6–5.5)
PROT SERPL-MCNC: 7.9 G/DL (ref 6–8.2)
PROT UR QL STRIP: NEGATIVE MG/DL
RBC # BLD AUTO: 5.48 M/UL (ref 4.7–6.1)
RBC # URNS HPF: ABNORMAL /HPF
RBC UR QL AUTO: ABNORMAL
SODIUM SERPL-SCNC: 130 MMOL/L (ref 135–145)
SP GR UR STRIP.AUTO: 1.01
UROBILINOGEN UR STRIP.AUTO-MCNC: 0.2 MG/DL
WBC # BLD AUTO: 18.5 K/UL (ref 4.8–10.8)
WBC #/AREA URNS HPF: ABNORMAL /HPF

## 2020-07-17 PROCEDURE — 83605 ASSAY OF LACTIC ACID: CPT | Mod: 91

## 2020-07-17 PROCEDURE — 700111 HCHG RX REV CODE 636 W/ 250 OVERRIDE (IP): Performed by: EMERGENCY MEDICINE

## 2020-07-17 PROCEDURE — 87086 URINE CULTURE/COLONY COUNT: CPT

## 2020-07-17 PROCEDURE — 82977 ASSAY OF GGT: CPT

## 2020-07-17 PROCEDURE — 82962 GLUCOSE BLOOD TEST: CPT

## 2020-07-17 PROCEDURE — 87040 BLOOD CULTURE FOR BACTERIA: CPT

## 2020-07-17 PROCEDURE — 83735 ASSAY OF MAGNESIUM: CPT

## 2020-07-17 PROCEDURE — 73701 CT LOWER EXTREMITY W/DYE: CPT | Mod: RT

## 2020-07-17 PROCEDURE — 700117 HCHG RX CONTRAST REV CODE 255: Performed by: EMERGENCY MEDICINE

## 2020-07-17 PROCEDURE — 96365 THER/PROPH/DIAG IV INF INIT: CPT

## 2020-07-17 PROCEDURE — 99285 EMERGENCY DEPT VISIT HI MDM: CPT

## 2020-07-17 PROCEDURE — 86140 C-REACTIVE PROTEIN: CPT

## 2020-07-17 PROCEDURE — 81001 URINALYSIS AUTO W/SCOPE: CPT

## 2020-07-17 PROCEDURE — 85652 RBC SED RATE AUTOMATED: CPT

## 2020-07-17 PROCEDURE — 700105 HCHG RX REV CODE 258: Performed by: EMERGENCY MEDICINE

## 2020-07-17 PROCEDURE — 80053 COMPREHEN METABOLIC PANEL: CPT

## 2020-07-17 PROCEDURE — 71045 X-RAY EXAM CHEST 1 VIEW: CPT

## 2020-07-17 PROCEDURE — 85025 COMPLETE CBC W/AUTO DIFF WBC: CPT

## 2020-07-17 RX ORDER — SODIUM CHLORIDE, SODIUM LACTATE, POTASSIUM CHLORIDE, AND CALCIUM CHLORIDE .6; .31; .03; .02 G/100ML; G/100ML; G/100ML; G/100ML
30 INJECTION, SOLUTION INTRAVENOUS ONCE
Status: COMPLETED | OUTPATIENT
Start: 2020-07-17 | End: 2020-07-18

## 2020-07-17 RX ADMIN — SODIUM CHLORIDE, POTASSIUM CHLORIDE, SODIUM LACTATE AND CALCIUM CHLORIDE 2535 ML: 600; 310; 30; 20 INJECTION, SOLUTION INTRAVENOUS at 21:54

## 2020-07-17 RX ADMIN — SODIUM CHLORIDE 3 G: 900 INJECTION INTRAVENOUS at 21:56

## 2020-07-17 RX ADMIN — IOHEXOL 80 ML: 350 INJECTION, SOLUTION INTRAVENOUS at 23:45

## 2020-07-17 ASSESSMENT — FIBROSIS 4 INDEX: FIB4 SCORE: 1.14

## 2020-07-18 PROBLEM — R73.9 HYPERGLYCEMIA: Status: ACTIVE | Noted: 2020-07-18

## 2020-07-18 PROBLEM — Z59.00 HOMELESSNESS: Status: ACTIVE | Noted: 2020-07-18

## 2020-07-18 PROBLEM — Z72.0 TOBACCO ABUSE: Status: ACTIVE | Noted: 2020-07-18

## 2020-07-18 PROBLEM — Q53.10 UNDESCENDED RIGHT TESTICLE: Status: ACTIVE | Noted: 2020-07-18

## 2020-07-18 PROBLEM — A41.9 SEPSIS (HCC): Status: ACTIVE | Noted: 2020-07-18

## 2020-07-18 LAB
ANION GAP SERPL CALC-SCNC: 11 MMOL/L (ref 7–16)
BUN SERPL-MCNC: 13 MG/DL (ref 8–22)
CALCIUM SERPL-MCNC: 8.6 MG/DL (ref 8.5–10.5)
CHLORIDE SERPL-SCNC: 102 MMOL/L (ref 96–112)
CO2 SERPL-SCNC: 23 MMOL/L (ref 20–33)
CREAT SERPL-MCNC: 1.23 MG/DL (ref 0.5–1.4)
ERYTHROCYTE [SEDIMENTATION RATE] IN BLOOD BY WESTERGREN METHOD: 3 MM/HOUR (ref 0–20)
EST. AVERAGE GLUCOSE BLD GHB EST-MCNC: 151 MG/DL
GGT SERPL-CCNC: 33 U/L (ref 7–51)
GLUCOSE SERPL-MCNC: 127 MG/DL (ref 65–99)
HBA1C MFR BLD: 6.9 % (ref 0–5.6)
LACTATE BLD-SCNC: 1.5 MMOL/L (ref 0.5–2)
MAGNESIUM SERPL-MCNC: 1.5 MG/DL (ref 1.5–2.5)
POTASSIUM SERPL-SCNC: 4 MMOL/L (ref 3.6–5.5)
SODIUM SERPL-SCNC: 136 MMOL/L (ref 135–145)

## 2020-07-18 PROCEDURE — 700111 HCHG RX REV CODE 636 W/ 250 OVERRIDE (IP): Performed by: STUDENT IN AN ORGANIZED HEALTH CARE EDUCATION/TRAINING PROGRAM

## 2020-07-18 PROCEDURE — 36415 COLL VENOUS BLD VENIPUNCTURE: CPT

## 2020-07-18 PROCEDURE — 700102 HCHG RX REV CODE 250 W/ 637 OVERRIDE(OP): Performed by: STUDENT IN AN ORGANIZED HEALTH CARE EDUCATION/TRAINING PROGRAM

## 2020-07-18 PROCEDURE — 700105 HCHG RX REV CODE 258: Performed by: STUDENT IN AN ORGANIZED HEALTH CARE EDUCATION/TRAINING PROGRAM

## 2020-07-18 PROCEDURE — 83036 HEMOGLOBIN GLYCOSYLATED A1C: CPT

## 2020-07-18 PROCEDURE — 83605 ASSAY OF LACTIC ACID: CPT

## 2020-07-18 PROCEDURE — A9270 NON-COVERED ITEM OR SERVICE: HCPCS | Performed by: STUDENT IN AN ORGANIZED HEALTH CARE EDUCATION/TRAINING PROGRAM

## 2020-07-18 PROCEDURE — 99222 1ST HOSP IP/OBS MODERATE 55: CPT | Performed by: NURSE PRACTITIONER

## 2020-07-18 PROCEDURE — 82270 OCCULT BLOOD FECES: CPT

## 2020-07-18 PROCEDURE — C9803 HOPD COVID-19 SPEC COLLECT: HCPCS | Performed by: STUDENT IN AN ORGANIZED HEALTH CARE EDUCATION/TRAINING PROGRAM

## 2020-07-18 PROCEDURE — 99221 1ST HOSP IP/OBS SF/LOW 40: CPT | Mod: GC | Performed by: INTERNAL MEDICINE

## 2020-07-18 PROCEDURE — 80048 BASIC METABOLIC PNL TOTAL CA: CPT

## 2020-07-18 PROCEDURE — 770020 HCHG ROOM/CARE - TELE (206)

## 2020-07-18 RX ORDER — BISACODYL 10 MG
10 SUPPOSITORY, RECTAL RECTAL
Status: DISCONTINUED | OUTPATIENT
Start: 2020-07-18 | End: 2020-07-21 | Stop reason: HOSPADM

## 2020-07-18 RX ORDER — SODIUM CHLORIDE, SODIUM LACTATE, POTASSIUM CHLORIDE, CALCIUM CHLORIDE 600; 310; 30; 20 MG/100ML; MG/100ML; MG/100ML; MG/100ML
INJECTION, SOLUTION INTRAVENOUS CONTINUOUS
Status: DISCONTINUED | OUTPATIENT
Start: 2020-07-18 | End: 2020-07-19

## 2020-07-18 RX ORDER — POLYETHYLENE GLYCOL 3350 17 G/17G
1 POWDER, FOR SOLUTION ORAL
Status: DISCONTINUED | OUTPATIENT
Start: 2020-07-18 | End: 2020-07-21 | Stop reason: HOSPADM

## 2020-07-18 RX ORDER — ACETAMINOPHEN 500 MG
1000 TABLET ORAL 3 TIMES DAILY
Status: DISCONTINUED | OUTPATIENT
Start: 2020-07-18 | End: 2020-07-21 | Stop reason: HOSPADM

## 2020-07-18 RX ORDER — OXYCODONE HYDROCHLORIDE 5 MG/1
2.5 TABLET ORAL
Status: DISCONTINUED | OUTPATIENT
Start: 2020-07-18 | End: 2020-07-21 | Stop reason: HOSPADM

## 2020-07-18 RX ORDER — SODIUM CHLORIDE, SODIUM LACTATE, POTASSIUM CHLORIDE, CALCIUM CHLORIDE 600; 310; 30; 20 MG/100ML; MG/100ML; MG/100ML; MG/100ML
INJECTION, SOLUTION INTRAVENOUS CONTINUOUS
Status: DISCONTINUED | OUTPATIENT
Start: 2020-07-18 | End: 2020-07-18

## 2020-07-18 RX ORDER — CELECOXIB 200 MG/1
200 CAPSULE ORAL 2 TIMES DAILY
Status: DISCONTINUED | OUTPATIENT
Start: 2020-07-18 | End: 2020-07-18

## 2020-07-18 RX ORDER — AMOXICILLIN 250 MG
2 CAPSULE ORAL 2 TIMES DAILY
Status: DISCONTINUED | OUTPATIENT
Start: 2020-07-18 | End: 2020-07-21 | Stop reason: HOSPADM

## 2020-07-18 RX ORDER — SODIUM CHLORIDE 9 MG/ML
INJECTION, SOLUTION INTRAVENOUS CONTINUOUS
Status: DISCONTINUED | OUTPATIENT
Start: 2020-07-18 | End: 2020-07-18

## 2020-07-18 RX ADMIN — SODIUM CHLORIDE: 9 INJECTION, SOLUTION INTRAVENOUS at 03:40

## 2020-07-18 RX ADMIN — ENOXAPARIN SODIUM 40 MG: 40 INJECTION SUBCUTANEOUS at 04:33

## 2020-07-18 RX ADMIN — SODIUM CHLORIDE, POTASSIUM CHLORIDE, SODIUM LACTATE AND CALCIUM CHLORIDE: 600; 310; 30; 20 INJECTION, SOLUTION INTRAVENOUS at 08:10

## 2020-07-18 RX ADMIN — SODIUM CHLORIDE 3 G: 900 INJECTION INTRAVENOUS at 06:11

## 2020-07-18 RX ADMIN — DOCUSATE SODIUM 50 MG AND SENNOSIDES 8.6 MG 2 TABLET: 8.6; 5 TABLET, FILM COATED ORAL at 17:21

## 2020-07-18 RX ADMIN — ACETAMINOPHEN 1000 MG: 500 TABLET ORAL at 17:21

## 2020-07-18 RX ADMIN — SODIUM CHLORIDE 3 G: 900 INJECTION INTRAVENOUS at 17:21

## 2020-07-18 RX ADMIN — SODIUM CHLORIDE 3 G: 900 INJECTION INTRAVENOUS at 13:08

## 2020-07-18 RX ADMIN — VANCOMYCIN HYDROCHLORIDE 3000 MG: 500 INJECTION, POWDER, LYOPHILIZED, FOR SOLUTION INTRAVENOUS at 03:40

## 2020-07-18 RX ADMIN — ACETAMINOPHEN 1000 MG: 500 TABLET ORAL at 08:07

## 2020-07-18 RX ADMIN — ACETAMINOPHEN 1000 MG: 500 TABLET ORAL at 13:07

## 2020-07-18 RX ADMIN — CELECOXIB 200 MG: 200 CAPSULE ORAL at 08:07

## 2020-07-18 ASSESSMENT — LIFESTYLE VARIABLES
HAVE YOU EVER FELT YOU SHOULD CUT DOWN ON YOUR DRINKING: NO
TOTAL SCORE: 0
AVERAGE NUMBER OF DAYS PER WEEK YOU HAVE A DRINK CONTAINING ALCOHOL: 0
HOW MANY TIMES IN THE PAST YEAR HAVE YOU HAD 5 OR MORE DRINKS IN A DAY: 0
HAVE PEOPLE ANNOYED YOU BY CRITICIZING YOUR DRINKING: NO
TOTAL SCORE: 0
EVER FELT BAD OR GUILTY ABOUT YOUR DRINKING: NO
ON A TYPICAL DAY WHEN YOU DRINK ALCOHOL HOW MANY DRINKS DO YOU HAVE: 0
EVER_SMOKED: YES
DOES PATIENT WANT TO STOP DRINKING: NO
ALCOHOL_USE: NO
TOTAL SCORE: 0
SUBSTANCE_ABUSE: 1
EVER HAD A DRINK FIRST THING IN THE MORNING TO STEADY YOUR NERVES TO GET RID OF A HANGOVER: NO
CONSUMPTION TOTAL: NEGATIVE

## 2020-07-18 ASSESSMENT — ENCOUNTER SYMPTOMS
FEVER: 1
HEMOPTYSIS: 0
WEAKNESS: 0
BLOOD IN STOOL: 1
DOUBLE VISION: 0
FEVER: 0
POLYDIPSIA: 1
CHILLS: 1
LOSS OF CONSCIOUSNESS: 0
TINGLING: 0
INSOMNIA: 0
NAUSEA: 0
BACK PAIN: 0
DIARRHEA: 0
HEADACHES: 0
VOMITING: 0
HEADACHES: 1
CHILLS: 0
BLURRED VISION: 1
PALPITATIONS: 0
ABDOMINAL PAIN: 0
FALLS: 0
EYE PAIN: 0
BRUISES/BLEEDS EASILY: 0
DIAPHORESIS: 0
SHORTNESS OF BREATH: 0
COUGH: 0
MYALGIAS: 0
DIZZINESS: 0
SORE THROAT: 0
EYE REDNESS: 0
NERVOUS/ANXIOUS: 0
CONSTIPATION: 0

## 2020-07-18 ASSESSMENT — COGNITIVE AND FUNCTIONAL STATUS - GENERAL
STANDING UP FROM CHAIR USING ARMS: A LITTLE
WALKING IN HOSPITAL ROOM: A LITTLE
MOVING TO AND FROM BED TO CHAIR: A LITTLE
MOVING FROM LYING ON BACK TO SITTING ON SIDE OF FLAT BED: A LITTLE
TURNING FROM BACK TO SIDE WHILE IN FLAT BAD: A LITTLE
MOBILITY SCORE: 18
PERSONAL GROOMING: A LITTLE
SUGGESTED CMS G CODE MODIFIER MOBILITY: CK
DAILY ACTIVITIY SCORE: 20
HELP NEEDED FOR BATHING: A LITTLE
TOILETING: A LITTLE
DRESSING REGULAR LOWER BODY CLOTHING: A LITTLE
SUGGESTED CMS G CODE MODIFIER DAILY ACTIVITY: CJ
CLIMB 3 TO 5 STEPS WITH RAILING: A LITTLE

## 2020-07-18 ASSESSMENT — PATIENT HEALTH QUESTIONNAIRE - PHQ9
1. LITTLE INTEREST OR PLEASURE IN DOING THINGS: NOT AT ALL
2. FEELING DOWN, DEPRESSED, IRRITABLE, OR HOPELESS: NOT AT ALL
SUM OF ALL RESPONSES TO PHQ9 QUESTIONS 1 AND 2: 0

## 2020-07-18 ASSESSMENT — FIBROSIS 4 INDEX
FIB4 SCORE: 1.53
FIB4 SCORE: 1.53

## 2020-07-18 NOTE — SENIOR ADMIT NOTE
Senior Admit Note    Timothy Dahl is a 59 y.o. male who presents to the hospital with symptoms of fever and leg pain. The patient states that he has been having right lower leg pain since he had an accident. States that his right lower extremity had been draining a purulent substance up until one to two days ago. States that he also has pain in his right upper thigh region.     Pertinent Physical Exam:  General: Ill-appearing  HEENT: No scleral icterus  Heart: Increased rate, regular rhythm.  Lungs: CTA throughout. No rales. No wheeze.  Abdomen: Nontender. Normal bowel sounds.   : Right inguinal lymph node, tender to palpation  Extremities: Right lower extremity with hyperpigmented/chronic appearing wound with some blood and dried purulent exudate  Neuro: A&O x4. No focal deficits    Labs:   WBC: 18,500, HgB: 17.9, Hct: 51.1, platelets: 205    CT: no evidence of abscess in right lower extremity  Right inguinal lymphadenopathy    #Sepsis  #Right Lower Extremity Cellulitis  -IV Vanc and Unasyn, IV fluids, sepsis protocol  -De-escalate antibiotics as clinically appropriate    #Right Inguinal Lymphadenopathy  -Tender to palpation, also present on CT    #?Cryptorchidism  -Present on CT    Please see Dr. Alexandre's H&P for full details    David Esposito D.O., R Internal Medicine Resident

## 2020-07-18 NOTE — PROGRESS NOTES
ED Admit      59-year-old homeless male who presented with complaints of right leg pain and fever, nonhealing right lower leg wound for 3 to 4 months, met sepsis criteria secondary to cellulitis.  CT scan  of the right leg did not show abscess.    [facility] internal medicine to admit

## 2020-07-18 NOTE — PROGRESS NOTES
2 RN skin check complete with REYNA Goldman.   Devices in place N/A.  Confirmed pressure ulcers found on N/A.  New potential pressure ulcers noted on 07/18/20. Wound consult placed 07/18/20.  The following interventions in place: Encouraged pt to TCDB, ambulate and perform ROM. Moisturizers and barrier creams in use.     Skin is intact   BLE 1+ swelling both extremities and scattered scabbing.   R leg large scab noted, pictures taken and uploaded. Wound consult to be put in.   Feet are dry, cracked, and calloused.

## 2020-07-18 NOTE — PROGRESS NOTES
Pt brought onto unit by this RN. Tele box placed on pt and monitor room notified; SR 90's. Pt denies pain. VSS. Call light and personal belongings within reach. Bed locked and in lowest position.

## 2020-07-18 NOTE — CONSULTS
"LIMB PRESERVATION SERVICE CONSULT      REFERRED BY: Benjamín Alexandre M.D.    DATE OF CONSULTATION: 7/18/2020    REASON FOR CONSULT: Right medial leg wound     HISTORY OF PRESENT ILLNESS: Timothy Dahl is a 59 y.o.  with a past medical history that includes tobacco use and homelessness admitted 7/17/2020 for Cellulitis and  Sepsis (HCC).   LPS has been consulted for evaluation right medial lower leg wound.    The patient reports wound to right lower extremity since approximately March 2020.  He reports that wound initially started as \"a bunch of little pimples, that became erythematous, edematous and intermittently drained yellow purulent fluid.  He states that approximately April 2020, he scraped his right lower leg on pavement while working on a car and since his wound has progressively gotten worse.  He reports pain to his wound that occasionally radiates to his right thigh, unchanged swelling and redness.  He describes the pain as \"like [his] muscles are twisting\".  No relieving or exacerbating factors.  Per epic, he was seen him in the emergency department on 5/24/2020 for a wound check, diagnosis cellulitis, and discharged home on oral clindamycin which he did not take.  He states that in the meantime, he has been cleaning his wound with warm water and a rag.  However, the patient developed fatigue and fevers 7/17/2020 which prompted him to seek care in emergency department.  Emergency department, patient is met sepsis protocol, CT of his right leg was obtained and was negative for abscess and right lower extremity.  But did show changes related to cellulitis and right inguinal adenopathy.  IV antibiotics were started on this admission.  Infectious diseases has not been consulted.    Ortho has not been consulted yet.  X-ray was not obtained to assess for osteomyelitis.    Patient denies history of diabetes and therefore does not check his blood sugars regularly.  He does report that he does check his feet " daily when he removes his shoes.  Patient normally wears nonprescriptive shoes.  Denies having had previous foot surgeries.  Patient reports that he lives locally and is currently unemployed.      Smoking:   reports that he has been smoking cigarettes. He has been smoking about 0.50 packs per day. He has never used smokeless tobacco.    Alcohol:   reports no history of alcohol use.    Drug:   reports no history of drug use.      PAST MEDICAL HISTORY: No past medical history on file.     PAST SURGICAL HISTORY:   Past Surgical History:   Procedure Laterality Date   • OTHER ORTHOPEDIC SURGERY      hand, knee       MEDICATIONS:   Current Facility-Administered Medications   Medication Dose   • senna-docusate (PERICOLACE or SENOKOT S) 8.6-50 MG per tablet 2 Tab  2 Tab    And   • polyethylene glycol/lytes (MIRALAX) PACKET 1 Packet  1 Packet    And   • magnesium hydroxide (MILK OF MAGNESIA) suspension 30 mL  30 mL    And   • bisacodyl (DULCOLAX) suppository 10 mg  10 mg   • enoxaparin (LOVENOX) inj 40 mg  40 mg   • ampicillin/sulbactam (UNASYN) 3 g in  mL IVPB  3 g   • lactated ringers infusion     • acetaminophen (TYLENOL) tablet 1,000 mg  1,000 mg   • oxyCODONE immediate-release (ROXICODONE) tablet 2.5 mg  2.5 mg   • Pharmacy Consult Request ...Pain Management Review 1 Each  1 Each       ALLERGIES:  No Known Allergies     FAMILY HISTORY: No family history on file.      REVIEW OF SYSTEMS:   Constitutional: Positive for fever, negative for chills  Respiratory: Negative for cough and shortness of breath.    Cardiovascular:Negative for chest pain, and claudication.   Gastrointestinal: Negative for constipation, diarrhea, nausea and vomiting.   Lower extremities: positive for swelling, redness, pain, and wound to right lower extremity  Neurological: Negative for numbness to feet and lower legs  All other systems reviewed and are negative     RESULTS:     Recent Labs     07/17/20  2130   WBC 18.5*   RBC 5.48   HEMOGLOBIN  "17.0   HEMATOCRIT 51.1   MCV 93.2   MCH 31.0   MCHC 33.3*   RDW 45.8   PLATELETCT 205   MPV 10.2     Recent Labs     07/17/20 2130 07/18/20  1138   SODIUM 130* 136   POTASSIUM 4.3 4.0   CHLORIDE 97 102   CO2 20 23   GLUCOSE 159* 127*   BUN 17 13         ESR:     Results from last 7 days   Lab Units 07/17/20 2130   SED RATE WESTERGREN 1526 mm/hour 3       CRP:       Results from last 7 days   Lab Units 07/17/20 2130   C REACTIVE PROTEIN 4596 mg/dL 2.55*         COVID-19: N/A    X-ray: None of lower extremity obtained this admission    MRI: None    CT:  CT-extremity, lower with right 7/17/2020    IMPRESSION:        1.  Changes of cellulitis, no focal enhancing abscess identified.  2.  Right inguinal adenopathy  3.  Retracted versus cryptorchid right testicle, correlate with exam. Urologic referral as clinically age-appropriate  4.  Diverticulosis      Arterial studies: None    A1c:  Lab Results   Component Value Date/Time    HBA1C 6.9 (H) 07/18/2020 04:58 AM          Microbiology:  Results     Procedure Component Value Units Date/Time    BLOOD CULTURE [[id number]] Collected:  07/17/20 2130    Order Status:  Completed Specimen:  Blood from Peripheral Updated:  07/18/20 0724     Significant Indicator NEG     Source BLD     Site PERIPHERAL     Culture Result No Growth  Note: Blood cultures are incubated for 5 days and  are monitored continuously.Positive blood cultures  are called to the RN and reported as soon as  they are identified.      Narrative:       Per Hospital Policy: Only change Specimen Src: to \"Line\" if  specified by physician order.  No site indicated    CULTURE WOUND W/ GRAM STAIN [[id number]]     Order Status:  Sent Specimen:  Wound from Right Leg     COVID/SARS CoV-2 PCR [[id number]]     Order Status:  Completed Specimen:  Respirate from Nasopharyngeal     S. Aureus By PCR, Nasal Complete [[id number]]     Order Status:  Sent Specimen:  Respirate from Respiratory     URINALYSIS [[id number]]  (Abnormal) " "Collected:  07/17/20 2330    Order Status:  Completed Specimen:  Urine, Clean Catch Updated:  07/17/20 2355     Color Yellow     Character Clear     Specific Gravity 1.010     Ph 5.0     Glucose Negative mg/dL      Ketones Negative mg/dL      Protein Negative mg/dL      Bilirubin Negative     Urobilinogen, Urine 0.2     Nitrite Negative     Leukocyte Esterase Negative     Occult Blood Small     Micro Urine Req Microscopic    Narrative:       Indication for culture:->Septic Shock: Persistent  hypotension,  Lactic acid > 4, vasopressors/inotropes started    URINE CULTURE(NEW) [[id number]] Collected:  07/17/20 2330    Order Status:  Completed Specimen:  Urine, Clean Catch Updated:  07/17/20 2341    Narrative:       Indication for culture:->Septic Shock: Persistent  hypotension,  Lactic acid > 4, vasopressors/inotropes started    BLOOD CULTURE [[id number]]     Order Status:  Sent Specimen:  Blood from Peripheral            PHYSICAL EXAMINATION:     VITAL SIGNS: /70   Pulse 74   Temp 37.3 °C (99.1 °F) (Temporal)   Resp 18   Ht 1.905 m (6' 3\")   Wt 117.4 kg (258 lb 13.1 oz)   SpO2 95%   BMI 32.35 kg/m²       General Appearance:  Unkept, disheveled male lying in bed in no acute distress    Lower Extremity Assessment:    Edema:   2+ nonpitting edema in right lower extremity, 1+ nonpitting edema left lower extremity    Pulses:  R foot: Palpable DP/PT pulses, biphasic PT tones and brisk monophasic DP tones heard via Doppler  L foot: Palpable DP/PT pulses, biphasic PT tones and brisk monophasic DP tones heard via Doppler    ROM dorsi/plantarflexion  WNL    Structural /mechanical changes:  None    Sensory Assessment  Feet Insensate to light touch  Monofilament testing with a 10 gram force: sensation intact: decreased bilaterally  Visual Inspection: Feet with calluses to bilateral outer heels but no other maceration, ulcers, fissures to bilateral feet  Pedal pulses: decreased bilaterally  Monofilament 7/10 " right  Monofilament 6/10 left    Wound Assessment:  Right medial lower extremity has chronic appearing scabbed over wound.  Right lower extremity is edematous and erythematous with border outlined with permanent marker.  There is tenderness with palpation to wound but no fluctuance or manual express drainage.  No increased redness with touch.  Skin around the wound and down to right medial ankle is dry, flaky, peeling.    Right medial lower extremity        ASSESSMENT AND PLAN:   The patient has a chronic appearing scabbed over wound with surrounding dry, flaky skin. Sensation intact to wound area and patient not tolerating attempted manual debridement. Honey colloid to be applied to soften scabbing for removal/debridement.  There is edema and erythema to right lower extremity but no fluctuance or induration with palpation.  CRP is slightly elevated, ESR within normal limits.  CT confirmed that there is no abscess under wound.suspicion for osteomyelitis is low.  The patient is already receiving IV antibiotics and consult for wound care has already been placed.  Anticipate that the patient's wound should heal with appropriate antibiotic therapy as well as wound care therapy.  Referral for outpatient wound care clinic placed.  Patient's hemoglobin A1c is 6.9.  Consult for diabetic education and registered dietitian has been placed.  The patient is homeless and failure to take oral antibiotic from previous emergency department visit or follow-up for wound check raises concern for noncompliance.     Wound care:   -Wound care orders placed for nursing  Cut to size and apply honey colloid to right medial leg wound. Secure with adhesive foam dressing. Change every 72 hours or PRN saturation or dislodgement.      Imaging/Labs:  -COVID-19: None    Vascular status:   -R foot: Palpable DP/PT pulses, biphasic PT tones and brisk monophasic DP tones heard via Doppler  L foot: Palpable DP/PT pulses, biphasic PT tones and brisk  monophasic DP tones heard via Doppler    Surgery:   -None at this time    Antibiotics:   -Unasyn IV, managed by hospitalist team.    Weight Bearing Status:   -Weight bearing as tolerated    Offloading:   None at this time, patient ambulatory and without foot wounds.    PT/OT:   -Consult placed by hospitalist team on 7/18/2020.  Seen by OT 7/18/2020    Diabetes Education:   - consult CDE and RD placed    - Implications of loss of protective sensation (LOPS) discussed with patient- including increased risk for amputation.  Advised to check feet at least daily, moisturize feet, and to always wear protective foot wear.   -avoid trimming own nails. See podiatrist or certified foot and nail RN  -keep blood sugars <150 for improved wound healing    D/W: pt, bedside RN, and wound care team      Discharge Plan:  Patient to remain inpatient for ongoing medical care for treatment of cellulitis and wound care therapy.      Follow up: Referral to outpatient wound care clinic placed.    Please note that this dictation was created using voice recognition software. I have  worked with technical experts from [employer] to optimize the interface.  I have made every reasonable attempt to correct obvious errors, but there may be errors of grammar and possibly content that I did not discover before finalizing the note.

## 2020-07-18 NOTE — PROGRESS NOTES
Daily Progress Note:     Date of Service: 7/18/2020  Primary Team: [facility] Red Team    Attending: Natasha Maynard M.D.  Senior Resident: CYNDY Corona M.D.  Contact:  [phone]    Chief Complaint:   RLE Cellulitis    ID:   59M admitted 7/17 w/ RLE cellulitis    Subjective:   No acute events overnight  Resting comfortably in bed this morning  Denies fevers, chills, shortness of breath, or chest pain  Pain controlled, has not required any pain medications    Consultants/Specialty:  None    Review of Systems:  Review of Systems   Constitutional: Negative for chills and fever (resolved).   HENT: Negative for congestion and sore throat.    Eyes: Negative for pain and redness.   Respiratory: Negative for hemoptysis and shortness of breath.    Cardiovascular: Negative for chest pain and palpitations.   Gastrointestinal: Negative for nausea and vomiting.   Genitourinary: Positive for hematuria (microscopic). Negative for frequency and urgency.   Musculoskeletal: Negative for back pain and falls.   Neurological: Negative for dizziness, weakness and headaches.   Psychiatric/Behavioral: The patient is not nervous/anxious and does not have insomnia.      Objective Data:   Physical Exam:   Vitals:   Temp:  [37.3 °C (99.1 °F)-38.7 °C (101.7 °F)] 37.3 °C (99.1 °F)  Pulse:  [] 74  Resp:  [14-22] 18  BP: (119-159)/(67-92) 119/70  SpO2:  [92 %-97 %] 95 %     Physical Exam  Vitals signs and nursing note reviewed.   Constitutional:       Appearance: Normal appearance.      Comments: Disheveled; unkempt   HENT:      Head: Normocephalic and atraumatic.      Right Ear: External ear normal.      Left Ear: External ear normal.      Nose: Nose normal. No congestion.      Mouth/Throat:      Mouth: Mucous membranes are moist.   Eyes:      Extraocular Movements: Extraocular movements intact.      Conjunctiva/sclera: Conjunctivae normal.      Pupils: Pupils are equal, round, and reactive to light.   Neck:      Musculoskeletal:  Normal range of motion and neck supple.   Cardiovascular:      Rate and Rhythm: Normal rate and regular rhythm.      Pulses: Normal pulses.      Heart sounds: Normal heart sounds.   Pulmonary:      Effort: Pulmonary effort is normal.      Breath sounds: Normal breath sounds.   Abdominal:      General: Abdomen is flat. There is no distension.      Palpations: Abdomen is soft.      Tenderness: There is no abdominal tenderness.   Musculoskeletal:      Right lower leg: No edema.      Left lower leg: No edema.      Comments: Crusted/flaking dry scaly skin over RIGHT lower leg, above ankle. Wound not expressing drainage. Minimally tender to palpation. Not bleeding   Skin:     General: Skin is warm and dry.      Capillary Refill: Capillary refill takes less than 2 seconds.   Neurological:      General: No focal deficit present.      Mental Status: He is alert and oriented to person, place, and time. Mental status is at baseline.   Psychiatric:         Mood and Affect: Mood normal.         Behavior: Behavior normal.     Labs:   Most Recent Labs:    Lab Results   Component Value Date/Time    WBC 18.5 (H) 07/17/2020 09:30 PM    RBC 5.48 07/17/2020 09:30 PM    HEMOGLOBIN 17.0 07/17/2020 09:30 PM    HEMATOCRIT 51.1 07/17/2020 09:30 PM    MCV 93.2 07/17/2020 09:30 PM    MCH 31.0 07/17/2020 09:30 PM    MCHC 33.3 (L) 07/17/2020 09:30 PM    MPV 10.2 07/17/2020 09:30 PM    NEUTSPOLYS 86.00 (H) 07/17/2020 09:30 PM    LYMPHOCYTES 7.20 (L) 07/17/2020 09:30 PM    MONOCYTES 5.30 07/17/2020 09:30 PM    EOSINOPHILS 0.10 07/17/2020 09:30 PM    BASOPHILS 0.50 07/17/2020 09:30 PM      Lab Results   Component Value Date/Time    SODIUM 130 (L) 07/17/2020 09:30 PM    POTASSIUM 4.3 07/17/2020 09:30 PM    CHLORIDE 97 07/17/2020 09:30 PM    CO2 20 07/17/2020 09:30 PM    GLUCOSE 159 (H) 07/17/2020 09:30 PM    BUN 17 07/17/2020 09:30 PM    CREATININE 1.16 07/17/2020 09:30 PM      Lab Results   Component Value Date/Time    ALTSGPT 35 07/17/2020 09:30  PM    ASTSGOT 31 07/17/2020 09:30 PM    ALKPHOSPHAT 133 (H) 07/17/2020 09:30 PM    TBILIRUBIN 0.6 07/17/2020 09:30 PM    LIPASE 26 10/28/2017 08:55 AM    ALBUMIN 4.2 07/17/2020 09:30 PM    GLOBULIN 3.7 (H) 07/17/2020 09:30 PM     Imaging:   CT-EXTREMITY, LOWER WITH RIGHT   Final Result         1.  Changes of cellulitis, no focal enhancing abscess identified.   2.  Right inguinal adenopathy   3.  Retracted versus cryptorchid right testicle, correlate with exam. Urologic referral as clinically age-appropriate   4.  Diverticulosis      DX-CHEST-PORTABLE (1 VIEW)   Final Result         1.  No acute cardiopulmonary disease.        * Sepsis (HCC)  Assessment & Plan  This is Sepsis Present on admission  SIRS criteria identified on my evaluation include: Fever, with temperature greater than 101 deg F, Tachycardia, with heart rate greater than 90 BPM, Tachypnea, with respirations greater than 20 per minute and Leukocytosis, with WBC greater than 12,000  Source is RLE cellulitis  Sepsis protocol initiated  Fluid resuscitation ordered per protocol  IV antibiotics as appropriate for source of sepsis  While organ dysfunction may be noted elsewhere in this problem list or in the chart, degree of organ dysfunction does not meet CMS criteria for severe sepsis    CT right lower extremity shows diffuse subcutaneous edema below the knee and is general dryness negative for drainable abscess  - Discharged from previous visit to ER 5/24/2020 with clindamycin prescription and told to f/u with [facility] for same cellulitis/wound. Blood cultures at the time were negative. Questionable compliance to therapy.    - Started on Unasyn in ER and given IV fluid boluses per sepsis protocol  - Low suspicion for osteomyelitis or necrotizing infection given CT findings and lack of crepitus on exam.  Although the wound has been chronic and looks semi-healed, the patient meets criteria for sepsis    Plan  - Trial Unasyn alone, will switch to Vanc if fails  to progress  - Wound culture  - Follow up blood cultures  - IV fluids    Homelessness  Assessment & Plan  Lives in van    Plan  - social work consulted    Undescended right testicle  Assessment & Plan  CT RLE noted for right testicle in right inguinal canal suggesting retracted versus cryptorchid  Not urological complaints    Plan  - Outpatient urologic follow up    Tobacco abuse  Assessment & Plan  3/4 to 1 ppd for 40+ years     Plan  - Tobacco cessation counseling provided  - Low dose CT chest lung cancer screening per PCP outpatient    Hyperglycemia  Assessment & Plan  Endorses polydypsia, polyuria, and polyphagia. Patient says several family members have had high blood glucoses.   Nonfasting glucose in ER 130s    Plan  - Follow up Hb A1c

## 2020-07-18 NOTE — PROGRESS NOTES
Received report from Sue, at pt bedside. Pt A/Ox 4, POC discussed. Call light and belongings within reach. Bed locked and in low position. Alarm on and fall precautions in place.

## 2020-07-18 NOTE — THERAPY
Occupational Therapy   Initial Evaluation     Patient Name: Timothy Dahl  Age:  59 y.o., Sex:  male  Medical Record #: [id number]  Today's Date: 7/18/2020 07/18/20 1239   Interdisciplinary Plan of Care Collaboration   Collaboration Comments OT eval held after conversation with nurse reporting patient drowsy and lethargic in am. Will continue to follow.

## 2020-07-18 NOTE — ASSESSMENT & PLAN NOTE
CT RLE noted for right testicle in right inguinal canal suggesting retracted versus cryptorchid  Not urological complaints  - Outpatient urologic follow up

## 2020-07-18 NOTE — ASSESSMENT & PLAN NOTE
Sepsis resolved.  This is Sepsis Present on admission  SIRS criteria identified on my evaluation include: Fever, with temperature greater than 101 deg F, Tachycardia, with heart rate greater than 90 BPM, Tachypnea, with respirations greater than 20 per minute and Leukocytosis, with WBC greater than 12,000  Source is RLE cellulitis  Sepsis protocol initiated  Fluid resuscitation ordered per protocol  IV antibiotics as appropriate for source of sepsis  While organ dysfunction may be noted elsewhere in this problem list or in the chart, degree of organ dysfunction does not meet CMS criteria for severe sepsis  CT right lower extremity shows diffuse subcutaneous edema below the knee and is general dryness negative for drainable abscess  Blood Cx NGTD
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
## 2020-07-18 NOTE — H&P
"History & Physical Note    Date of Admission: 7/18/2020  Admission Status: Emergency  [facility] Team: [facility] Red Team  Attending: Dr. Maynard  Senior Resident: Dr. Corona  Intern:    Contact Number: [phone]    Chief Complaint: Fever and N/V       History of Present Illness (HPI):   59M presenting with fever and RLE wound. Patient says he has had right lower extremity wound for the past 3 months.  He says it initially started as a \"bunch of little pimples\" that became erythematous, edematous, and has been draining yellow, purulent fluid intermittently.  Patient also says over the last 3 months the pain has moved from his foot up into his right thigh.  He says his last use of antibiotics was 3 months ago for the same wound but cannot recall which antibiotics.  In the interim, he has done nothing to treat the wound.  Last night he developed fevers, and chills and decided to come to the emergency department.  Patient says he may have scratched it 3 months ago while working on his friend's truck.  Additionally, he has been walking around in the [place] with the wound.  He denies pain, paresthesias, pallor, or paralysis of the right lower extremity.  Patient denies nausea, vomiting, or pain anywhere else on his body.  He denies history of soft tissue infection or MRSA.  He also denies history of IV drug use, HIV infection, or recent hospitalization.    Review of Systems: Review of Systems   Constitutional: Positive for chills and fever. Negative for diaphoresis.   HENT: Negative for hearing loss and tinnitus.    Eyes: Positive for blurred vision. Negative for double vision.   Respiratory: Negative for cough and shortness of breath.    Cardiovascular: Negative for chest pain and palpitations.   Gastrointestinal: Positive for blood in stool. Negative for abdominal pain, constipation, diarrhea, nausea and vomiting.   Genitourinary: Positive for frequency. Negative for dysuria and urgency.   Musculoskeletal: Negative for " falls and myalgias.   Skin: Negative for itching and rash.   Neurological: Positive for headaches. Negative for tingling and loss of consciousness.   Endo/Heme/Allergies: Positive for polydipsia. Negative for environmental allergies. Does not bruise/bleed easily.   Psychiatric/Behavioral: Positive for substance abuse. Negative for suicidal ideas.        Past Medical History:    has no past medical history on file.    Past Surgical History:  has a past surgical history that includes other orthopedic surgery.    Medications:   None        Allergies: No Known Allergies    Family History:   family history is not on file.     Social History:   Tobacco: 3/4 pack/day for most of life  Alcohol: Denies  Recreational drugs (illegal and prescription): Denies currently but said he used methamphetamine 1.5 months ago  Employment:   Activity Level:    Living situation: Homeless; lives in van  Recent travel: Denies  Primary Care Provider:  Pcp Pt States None      Vitals:  Temp:  [38.7 °C (101.7 °F)] 38.7 °C (101.7 °F)  Pulse:  [] 84  Resp:  [14-22] 20  BP: (141-159)/(67-92) 152/67  SpO2:  [93 %-97 %] 94 %    Physical Exam  Constitutional:       Appearance: He is ill-appearing. He is not toxic-appearing or diaphoretic.   HENT:      Head: Normocephalic and atraumatic.      Right Ear: External ear normal.      Left Ear: External ear normal.      Mouth/Throat:      Mouth: Mucous membranes are moist.      Pharynx: Oropharynx is clear.   Eyes:      General: No scleral icterus.     Conjunctiva/sclera: Conjunctivae normal.   Neck:      Musculoskeletal: Normal range of motion. No neck rigidity.   Cardiovascular:      Rate and Rhythm: Regular rhythm. Tachycardia present.      Pulses: Normal pulses.      Heart sounds: Murmur present.   Pulmonary:      Effort: Pulmonary effort is normal. No respiratory distress.      Breath sounds: Normal breath sounds. No wheezing or rales.   Abdominal:      Palpations: Abdomen is soft.       Tenderness: There is no abdominal tenderness. There is no guarding or rebound.      Comments: Large fat pad   Genitourinary:     Comments: Patient deferred rectal exam.  Approximately 1 cm lymph node that was tender to palpation in the right inguinal crease.  Musculoskeletal:      Left lower leg: No edema.      Comments: Minimal nonpitting edema of right lower extremity wound   Skin:     General: Skin is warm.      Capillary Refill: Capillary refill takes less than 2 seconds.      Coloration: Skin is not jaundiced or pale.      Findings: Erythema and lesion present.      Comments: 13 cm x 10 cm, non-circumferential, dry, wound with scab on medial inferior right shin with surrounding erythema and hyperpigmentation.  No fluctuant mass palpated on exam.  Wound was mildly tender to palpation.   Neurological:      Mental Status: He is alert and oriented to person, place, and time.      Comments: Right lower extremity sensation to light touch grossly intact.  Muscular strength of right lower extremity grossly intact.   Psychiatric:         Behavior: Behavior normal.         Thought Content: Thought content normal.         Labs:   Results for orders placed or performed during the hospital encounter of 07/17/20   Lactic acid (lactate)   Result Value Ref Range    Lactic Acid 2.6 (H) 0.5 - 2.0 mmol/L   Lactic acid (lactate): Repeat if initial lactic acid result is greater than 2   Result Value Ref Range    Lactic Acid 2.1 (H) 0.5 - 2.0 mmol/L   CBC WITH DIFFERENTIAL   Result Value Ref Range    WBC 18.5 (H) 4.8 - 10.8 K/uL    RBC 5.48 4.70 - 6.10 M/uL    Hemoglobin 17.0 14.0 - 18.0 g/dL    Hematocrit 51.1 42.0 - 52.0 %    MCV 93.2 81.4 - 97.8 fL    MCH 31.0 27.0 - 33.0 pg    MCHC 33.3 (L) 33.7 - 35.3 g/dL    RDW 45.8 35.9 - 50.0 fL    Platelet Count 205 164 - 446 K/uL    MPV 10.2 9.0 - 12.9 fL    Neutrophils-Polys 86.00 (H) 44.00 - 72.00 %    Lymphocytes 7.20 (L) 22.00 - 41.00 %    Monocytes 5.30 0.00 - 13.40 %    Eosinophils  0.10 0.00 - 6.90 %    Basophils 0.50 0.00 - 1.80 %    Immature Granulocytes 0.90 0.00 - 0.90 %    Nucleated RBC 0.00 /100 WBC    Neutrophils (Absolute) 15.92 (H) 1.82 - 7.42 K/uL    Lymphs (Absolute) 1.33 1.00 - 4.80 K/uL    Monos (Absolute) 0.99 (H) 0.00 - 0.85 K/uL    Eos (Absolute) 0.02 0.00 - 0.51 K/uL    Baso (Absolute) 0.09 0.00 - 0.12 K/uL    Immature Granulocytes (abs) 0.17 (H) 0.00 - 0.11 K/uL    NRBC (Absolute) 0.00 K/uL   COMP METABOLIC PANEL   Result Value Ref Range    Sodium 130 (L) 135 - 145 mmol/L    Potassium 4.3 3.6 - 5.5 mmol/L    Chloride 97 96 - 112 mmol/L    Co2 20 20 - 33 mmol/L    Anion Gap 13.0 7.0 - 16.0    Glucose 159 (H) 65 - 99 mg/dL    Bun 17 8 - 22 mg/dL    Creatinine 1.16 0.50 - 1.40 mg/dL    Calcium 9.5 8.5 - 10.5 mg/dL    AST(SGOT) 31 12 - 45 U/L    ALT(SGPT) 35 2 - 50 U/L    Alkaline Phosphatase 133 (H) 30 - 99 U/L    Total Bilirubin 0.6 0.1 - 1.5 mg/dL    Albumin 4.2 3.2 - 4.9 g/dL    Total Protein 7.9 6.0 - 8.2 g/dL    Globulin 3.7 (H) 1.9 - 3.5 g/dL    A-G Ratio 1.1 g/dL   URINALYSIS    Specimen: Urine, Clean Catch   Result Value Ref Range    Color Yellow     Character Clear     Specific Gravity 1.010 <1.035    Ph 5.0 5.0 - 8.0    Glucose Negative Negative mg/dL    Ketones Negative Negative mg/dL    Protein Negative Negative mg/dL    Bilirubin Negative Negative    Urobilinogen, Urine 0.2 Negative    Nitrite Negative Negative    Leukocyte Esterase Negative Negative    Occult Blood Small (A) Negative    Micro Urine Req Microscopic    Sed Rate   Result Value Ref Range    Sed Rate Westergren 3 0 - 20 mm/hour   CRP QUANTITIVE (NON-CARDIAC)   Result Value Ref Range    Stat C-Reactive Protein 2.55 (H) 0.00 - 0.75 mg/dL   ESTIMATED GFR   Result Value Ref Range    GFR If African American >60 >60 mL/min/1.73 m 2    GFR If Non African American >60 >60 mL/min/1.73 m 2   URINE MICROSCOPIC (W/UA)   Result Value Ref Range    WBC 0-2 (A) /hpf    RBC 2-5 (A) /hpf    Bacteria Negative None /hpf     Epithelial Cells Negative /hpf    Hyaline Cast 0-2 /lpf   ACCU-CHEK GLUCOSE   Result Value Ref Range    Glucose - Accu-Ck 162 (H) 65 - 99 mg/dL        EKG: No results found for this or any previous visit.     Imaging:   CT-EXTREMITY, LOWER WITH RIGHT   Final Result         1.  Changes of cellulitis, no focal enhancing abscess identified.   2.  Right inguinal adenopathy   3.  Retracted versus cryptorchid right testicle, correlate with exam. Urologic referral as clinically age-appropriate   4.  Diverticulosis      DX-CHEST-PORTABLE (1 VIEW)   Final Result         1.  No acute cardiopulmonary disease.           Previous Data Review: Reviewed    Sepsis ([facility])  Assessment & Plan  This is Sepsis Present on admission  SIRS criteria identified on my evaluation include: Fever, with temperature greater than 101 deg F, Tachycardia, with heart rate greater than 90 BPM, Tachypnea, with respirations greater than 20 per minute and Leukocytosis, with WBC greater than 12,000  Source is RLE cellulitis  Sepsis protocol initiated  Fluid resuscitation ordered per protocol  IV antibiotics as appropriate for source of sepsis  While organ dysfunction may be noted elsewhere in this problem list or in the chart, degree of organ dysfunction does not meet CMS criteria for severe sepsis    CT right lower extremity shows diffuse subcutaneous edema below the knee and is general dryness negative for drainable abscess  - Discharged from previous visit to ER 5/24/2020 with clindamycin prescription and told to f/u with [facility] for same cellulitis/wound. Blood cultures at the time were negative. Questionable compliance to therapy.    - Started on Unasyn in ER and given IV fluid boluses per sepsis protocol  - Low suspicion for osteomyelitis or necrotizing infection given CT findings and lack of crepitus on exam.  Although the wound has been chronic and looks semi-healed, the patient meets criteria for sepsis and endorses history of recent purulent  drainage. Therefore, vancomycin was added to Unasyn for empiric MRSA coverage.    Plan  - IV fluids  - continue vancomycin (started 7/18/2020)  - continue Unasyn (started 7/17/2020)  - order wound culture  - follow up blood cultures  - monitor for symptoms/signs of compartment syndrome  - contact precautions pending MRSA nares and cultures    Homelessness  Assessment & Plan  Lives in van    Plan  - social work consult    Undescended right testicle  Assessment & Plan  - CT RLE showed right testicle in right inguinal canal suggesting retracted versus cryptorchid    Plan  - schedule urologic referral    Tobacco abuse  Assessment & Plan  Smokes 3/4 ppd for most of his life. States he is interested in quitting.    Plan  - tobacco cessation counseling provided  -    Hyperglycemia  Assessment & Plan  Endorses polydypsia, polyuria, and polyphagia. Patient says several family members have had high blood glucoses.   - nonfasting glucose in ER 130s    Plan  - check Hb A1c

## 2020-07-18 NOTE — PROGRESS NOTES
"Pharmacy Kinetics   59 y.o. male on vancomycin day # 1 2020    Currently on Vancomycin 1750 mg iv q12hr  Provider specified end date: TBD    Indication for Treatment: SSTI    Pertinent history per medical record: Admitted on 2020 for cellulitis of right leg. Pt was septic on admission with fever and leukocytosis. Wound was positive for redness, swelling, pain and discharge.     Other antibiotics: Unasyn 3g IV q6hr x 5 days ordered    Allergies: Patient has no known allergies.     List concerns for renal function: obesity (BMI~32), hypermetabolic state (SIRS)    Pertinent cultures to date:    wound culture needs to be collected   PBC x 2 in process     Recent Labs     20  2130   WBC 18.5*   NEUTSPOLYS 86.00*     Recent Labs     20  2130   BUN 17   CREATININE 1.16   ALBUMIN 4.2     No results for input(s): VANCOTROUGH, VANCOPEAK, VANCORANDOM in the last 72 hours.No intake or output data in the 24 hours ending 20 0523   /67   Pulse 84   Temp 37.6 °C (99.7 °F) (Temporal)   Resp 18   Ht 1.905 m (6' 3\")   Wt 117.4 kg (258 lb 13.1 oz)   SpO2 97%  Temp (24hrs), Av °C (100.4 °F), Min:37.6 °C (99.7 °F), Max:38.7 °C (101.7 °F)      A/P   1. Vancomycin dose change: new start, 15mg/kg IV q12hr x 3 days ordered  2. Next vancomycin level: not yet ordered, recommend prior to 4th total dose  3. Goal trough: 10-15 mcg/ml  4. Comments: No major concern for renal accumulation. Pharmacy will order trough, follow up with culture, and narrow therapy as able.    Yuko Marti, PharmD      "

## 2020-07-18 NOTE — ASSESSMENT & PLAN NOTE
3/4 to 1 ppd for 40+ years   - Tobacco cessation counseling provided  - Low dose CT chest lung cancer screening per PCP outpatient

## 2020-07-18 NOTE — ASSESSMENT & PLAN NOTE
Hemoglobin A1c 6.9  Contributing to poor RLE wound healing  Symptomatic with polydypsia, polyuria, and polyphagia w/ positive family history    - Metformin 500mg BID  - Diabetes education  - Outpatient follow up with PCP

## 2020-07-18 NOTE — ED TRIAGE NOTES
"Chief Complaint   Patient presents with   • Fever   • N/V     60 yo male in wheelchair to triage for above complaint. Pt reports this afternoon he was in the sun and developed N/V and weakness, pt AOx4 but appears lethargic, sepsis score of 4, charge RN notified, pt to GR 27. FSBS 162. Dried cracked wound to R ankle noted, pt states he did not receive ABX treatment for it.    Pt to GR 27.    /92   Pulse (!) 110   Temp (!) 38.7 °C (101.7 °F) (Oral)   Resp 14   Ht 1.905 m (6' 3\")   Wt 120 kg (264 lb 8.8 oz)   SpO2 96%   BMI 33.07 kg/m²   "

## 2020-07-19 PROBLEM — R31.21 ASYMPTOMATIC MICROSCOPIC HEMATURIA: Status: ACTIVE | Noted: 2020-07-19

## 2020-07-19 PROBLEM — E11.628 TYPE 2 DIABETES MELLITUS WITH SKIN COMPLICATION (HCC): Status: ACTIVE | Noted: 2020-07-18

## 2020-07-19 LAB
ALBUMIN SERPL BCP-MCNC: 2.9 G/DL (ref 3.2–4.9)
ALBUMIN/GLOB SERPL: 0.9 G/DL
ALP SERPL-CCNC: 99 U/L (ref 30–99)
ALT SERPL-CCNC: 29 U/L (ref 2–50)
ANION GAP SERPL CALC-SCNC: 7 MMOL/L (ref 7–16)
APPEARANCE UR: CLEAR
AST SERPL-CCNC: 24 U/L (ref 12–45)
BACTERIA #/AREA URNS HPF: NEGATIVE /HPF
BASOPHILS # BLD AUTO: 0.4 % (ref 0–1.8)
BASOPHILS # BLD: 0.03 K/UL (ref 0–0.12)
BILIRUB SERPL-MCNC: 0.3 MG/DL (ref 0.1–1.5)
BILIRUB UR QL STRIP.AUTO: NEGATIVE
BUN SERPL-MCNC: 16 MG/DL (ref 8–22)
CALCIUM SERPL-MCNC: 8.4 MG/DL (ref 8.5–10.5)
CHLORIDE SERPL-SCNC: 102 MMOL/L (ref 96–112)
CO2 SERPL-SCNC: 28 MMOL/L (ref 20–33)
COLOR UR: YELLOW
CREAT SERPL-MCNC: 1.14 MG/DL (ref 0.5–1.4)
EOSINOPHIL # BLD AUTO: 0.17 K/UL (ref 0–0.51)
EOSINOPHIL NFR BLD: 2.4 % (ref 0–6.9)
EPI CELLS #/AREA URNS HPF: NEGATIVE /HPF
ERYTHROCYTE [DISTWIDTH] IN BLOOD BY AUTOMATED COUNT: 47.6 FL (ref 35.9–50)
GLOBULIN SER CALC-MCNC: 3.2 G/DL (ref 1.9–3.5)
GLUCOSE SERPL-MCNC: 123 MG/DL (ref 65–99)
GLUCOSE UR STRIP.AUTO-MCNC: NEGATIVE MG/DL
HCT VFR BLD AUTO: 46.5 % (ref 42–52)
HEMOCCULT SP1 STL QL: NEGATIVE
HGB BLD-MCNC: 15 G/DL (ref 14–18)
HYALINE CASTS #/AREA URNS LPF: ABNORMAL /LPF
IMM GRANULOCYTES # BLD AUTO: 0.04 K/UL (ref 0–0.11)
IMM GRANULOCYTES NFR BLD AUTO: 0.6 % (ref 0–0.9)
KETONES UR STRIP.AUTO-MCNC: NEGATIVE MG/DL
LEUKOCYTE ESTERASE UR QL STRIP.AUTO: NEGATIVE
LYMPHOCYTES # BLD AUTO: 1.3 K/UL (ref 1–4.8)
LYMPHOCYTES NFR BLD: 18.3 % (ref 22–41)
MAGNESIUM SERPL-MCNC: 1.9 MG/DL (ref 1.5–2.5)
MCH RBC QN AUTO: 30.5 PG (ref 27–33)
MCHC RBC AUTO-ENTMCNC: 32.3 G/DL (ref 33.7–35.3)
MCV RBC AUTO: 94.7 FL (ref 81.4–97.8)
MICRO URNS: ABNORMAL
MONOCYTES # BLD AUTO: 0.76 K/UL (ref 0–0.85)
MONOCYTES NFR BLD AUTO: 10.7 % (ref 0–13.4)
NEUTROPHILS # BLD AUTO: 4.8 K/UL (ref 1.82–7.42)
NEUTROPHILS NFR BLD: 67.6 % (ref 44–72)
NITRITE UR QL STRIP.AUTO: NEGATIVE
NRBC # BLD AUTO: 0 K/UL
NRBC BLD-RTO: 0 /100 WBC
PH UR STRIP.AUTO: 6 [PH] (ref 5–8)
PHOSPHATE SERPL-MCNC: 2.7 MG/DL (ref 2.5–4.5)
PLATELET # BLD AUTO: 143 K/UL (ref 164–446)
PMV BLD AUTO: 10.2 FL (ref 9–12.9)
POTASSIUM SERPL-SCNC: 4.2 MMOL/L (ref 3.6–5.5)
PROT SERPL-MCNC: 6.1 G/DL (ref 6–8.2)
PROT UR QL STRIP: NEGATIVE MG/DL
RBC # BLD AUTO: 4.91 M/UL (ref 4.7–6.1)
RBC # URNS HPF: ABNORMAL /HPF
RBC UR QL AUTO: ABNORMAL
SODIUM SERPL-SCNC: 137 MMOL/L (ref 135–145)
SP GR UR STRIP.AUTO: 1.02
UROBILINOGEN UR STRIP.AUTO-MCNC: 1 MG/DL
WBC # BLD AUTO: 7.1 K/UL (ref 4.8–10.8)
WBC #/AREA URNS HPF: ABNORMAL /HPF

## 2020-07-19 PROCEDURE — 11045 DBRDMT SUBQ TISS EACH ADDL: CPT | Performed by: NURSE PRACTITIONER

## 2020-07-19 PROCEDURE — 36415 COLL VENOUS BLD VENIPUNCTURE: CPT

## 2020-07-19 PROCEDURE — 700105 HCHG RX REV CODE 258: Performed by: STUDENT IN AN ORGANIZED HEALTH CARE EDUCATION/TRAINING PROGRAM

## 2020-07-19 PROCEDURE — 84100 ASSAY OF PHOSPHORUS: CPT

## 2020-07-19 PROCEDURE — 80053 COMPREHEN METABOLIC PANEL: CPT

## 2020-07-19 PROCEDURE — 99233 SBSQ HOSP IP/OBS HIGH 50: CPT | Mod: GC | Performed by: INTERNAL MEDICINE

## 2020-07-19 PROCEDURE — 99232 SBSQ HOSP IP/OBS MODERATE 35: CPT | Mod: 25 | Performed by: NURSE PRACTITIONER

## 2020-07-19 PROCEDURE — 97161 PT EVAL LOW COMPLEX 20 MIN: CPT

## 2020-07-19 PROCEDURE — 700111 HCHG RX REV CODE 636 W/ 250 OVERRIDE (IP): Performed by: STUDENT IN AN ORGANIZED HEALTH CARE EDUCATION/TRAINING PROGRAM

## 2020-07-19 PROCEDURE — 700101 HCHG RX REV CODE 250: Performed by: NURSE PRACTITIONER

## 2020-07-19 PROCEDURE — 770020 HCHG ROOM/CARE - TELE (206)

## 2020-07-19 PROCEDURE — 83735 ASSAY OF MAGNESIUM: CPT

## 2020-07-19 PROCEDURE — 11042 DBRDMT SUBQ TIS 1ST 20SQCM/<: CPT | Performed by: NURSE PRACTITIONER

## 2020-07-19 PROCEDURE — 81001 URINALYSIS AUTO W/SCOPE: CPT

## 2020-07-19 PROCEDURE — 85025 COMPLETE CBC W/AUTO DIFF WBC: CPT

## 2020-07-19 PROCEDURE — 97166 OT EVAL MOD COMPLEX 45 MIN: CPT

## 2020-07-19 PROCEDURE — A9270 NON-COVERED ITEM OR SERVICE: HCPCS | Performed by: STUDENT IN AN ORGANIZED HEALTH CARE EDUCATION/TRAINING PROGRAM

## 2020-07-19 PROCEDURE — 700102 HCHG RX REV CODE 250 W/ 637 OVERRIDE(OP): Performed by: STUDENT IN AN ORGANIZED HEALTH CARE EDUCATION/TRAINING PROGRAM

## 2020-07-19 RX ORDER — LIDOCAINE HYDROCHLORIDE 20 MG/ML
15 SOLUTION OROPHARYNGEAL ONCE
Status: COMPLETED | OUTPATIENT
Start: 2020-07-19 | End: 2020-07-19

## 2020-07-19 RX ORDER — SODIUM CHLORIDE 9 MG/ML
INJECTION, SOLUTION INTRAVENOUS
Status: ACTIVE
Start: 2020-07-19 | End: 2020-07-19

## 2020-07-19 RX ORDER — LIDOCAINE HYDROCHLORIDE 20 MG/ML
15 SOLUTION OROPHARYNGEAL ONCE
Status: DISCONTINUED | OUTPATIENT
Start: 2020-07-19 | End: 2020-07-19

## 2020-07-19 RX ORDER — MORPHINE SULFATE 4 MG/ML
2-4 INJECTION, SOLUTION INTRAMUSCULAR; INTRAVENOUS EVERY 4 HOURS PRN
Status: DISCONTINUED | OUTPATIENT
Start: 2020-07-19 | End: 2020-07-21 | Stop reason: HOSPADM

## 2020-07-19 RX ORDER — MAGNESIUM SULFATE HEPTAHYDRATE 40 MG/ML
2 INJECTION, SOLUTION INTRAVENOUS ONCE
Status: COMPLETED | OUTPATIENT
Start: 2020-07-19 | End: 2020-07-19

## 2020-07-19 RX ADMIN — SODIUM CHLORIDE 3 G: 900 INJECTION INTRAVENOUS at 18:01

## 2020-07-19 RX ADMIN — MORPHINE SULFATE 4 MG: 4 INJECTION INTRAVENOUS at 09:10

## 2020-07-19 RX ADMIN — SODIUM CHLORIDE 3 G: 900 INJECTION INTRAVENOUS at 04:53

## 2020-07-19 RX ADMIN — LIDOCAINE HYDROCHLORIDE 15 ML: 20 SOLUTION ORAL; TOPICAL at 09:15

## 2020-07-19 RX ADMIN — ACETAMINOPHEN 1000 MG: 500 TABLET ORAL at 04:53

## 2020-07-19 RX ADMIN — MAGNESIUM SULFATE 2 G: 2 INJECTION INTRAVENOUS at 09:14

## 2020-07-19 RX ADMIN — ENOXAPARIN SODIUM 40 MG: 40 INJECTION SUBCUTANEOUS at 04:52

## 2020-07-19 RX ADMIN — METFORMIN HYDROCHLORIDE 500 MG: 500 TABLET ORAL at 11:44

## 2020-07-19 RX ADMIN — SODIUM CHLORIDE 3 G: 900 INJECTION INTRAVENOUS at 00:48

## 2020-07-19 RX ADMIN — ACETAMINOPHEN 1000 MG: 500 TABLET ORAL at 18:01

## 2020-07-19 RX ADMIN — SODIUM CHLORIDE 3 G: 900 INJECTION INTRAVENOUS at 11:45

## 2020-07-19 RX ADMIN — METFORMIN HYDROCHLORIDE 500 MG: 500 TABLET ORAL at 18:01

## 2020-07-19 RX ADMIN — ACETAMINOPHEN 1000 MG: 500 TABLET ORAL at 11:45

## 2020-07-19 ASSESSMENT — ENCOUNTER SYMPTOMS
HEMOPTYSIS: 0
VOMITING: 0
INSOMNIA: 0
SORE THROAT: 0
BACK PAIN: 0
EYE PAIN: 0
WEAKNESS: 0
FEVER: 0
FALLS: 0
NAUSEA: 0
PALPITATIONS: 0
NERVOUS/ANXIOUS: 0
CHILLS: 0
POLYDIPSIA: 1
EYE REDNESS: 0
SHORTNESS OF BREATH: 0
HEADACHES: 0
DIZZINESS: 0

## 2020-07-19 ASSESSMENT — COGNITIVE AND FUNCTIONAL STATUS - GENERAL
DAILY ACTIVITIY SCORE: 23
SUGGESTED CMS G CODE MODIFIER MOBILITY: CJ
HELP NEEDED FOR BATHING: A LITTLE
MOBILITY SCORE: 21
SUGGESTED CMS G CODE MODIFIER DAILY ACTIVITY: CI
CLIMB 3 TO 5 STEPS WITH RAILING: A LITTLE
MOVING TO AND FROM BED TO CHAIR: A LITTLE
TURNING FROM BACK TO SIDE WHILE IN FLAT BAD: A LITTLE

## 2020-07-19 ASSESSMENT — GAIT ASSESSMENTS
DISTANCE (FEET): 300
DEVIATION: NO DEVIATION
GAIT LEVEL OF ASSIST: SUPERVISED

## 2020-07-19 ASSESSMENT — FIBROSIS 4 INDEX: FIB4 SCORE: 1.87

## 2020-07-19 ASSESSMENT — ACTIVITIES OF DAILY LIVING (ADL): TOILETING: INDEPENDENT

## 2020-07-19 NOTE — PROGRESS NOTES
Bedside report received from REYNA carrillo. POC discussed with pt; all questions answered at this time. Call light within reach. Bed locked and in lowest position. Bed alarm on.

## 2020-07-19 NOTE — THERAPY
Physical Therapy   Initial Evaluation     Patient Name: Timothy Dahl  Age:  59 y.o., Sex:  male  Medical Record #: [id number]  Today's Date: 7/19/2020          Assessment   Patient is 59 y.o. male with a diagnosis of R LE cellulitis and sepsis. Pt agreeable to work with therapist. Pt with residual R LE deficits from accident years ago. B LE sensation deficits. Pt appears at baseline for functional mobility with no deficits limiting his return to prior level of function. No further acute PT needs.     Plan    Recommend Physical Therapy for Evaluation only for the following treatments:  none    Discharge recommendations:  Anticipate that the patient will have no further physical therapy needs after discharge from the hospital.       07/19/20 0836   Prior Living Situation   Prior Services None   Housing / Facility Other (Comments)  (Van)   Steps Into Home 0   Steps In Home 0   Equipment Owned None   Lives with - Patient's Self Care Capacity Alone and Able to Care For Self   Comments pt lives alone in a van and is independent    Prior Level of Functional Mobility   Bed Mobility Independent   Transfer Status Independent   Ambulation Independent   Distance Ambulation (Feet)   (community)   Assistive Devices Used None   Stairs Independent   Comments independent prior   History of Falls   History of Falls No   Sensation Lower Body   Lower Extremity Sensation   X   Comments diminished R LE sensation to Lt, absent sensation on L LE to Lt   Gait Analysis   Gait Level Of Assist Supervised   Assistive Device None   Distance (Feet) 300   # of Times Distance was Traveled 1   Deviation No deviation   Weight Bearing Status FWB   Vision Deficits Impacting Mobility none   Comments no LOB   Bed Mobility    Supine to Sit Supervised   Sit to Supine Supervised   Scooting Supervised   Rolling Supervised   Functional Mobility   Sit to Stand Supervised   Mobility hallway no AD   Edema / Skin Assessment   Edema / Skin  Not Assessed    Comments R LE wound   Anticipated Discharge Equipment   DC Equipment None

## 2020-07-19 NOTE — ASSESSMENT & PLAN NOTE
Abnormal UA w/ 2-5 RBCs   No protein  Consistent w/ nonglomerular hematuria  - Outpatient follow up with PCP

## 2020-07-19 NOTE — PROGRESS NOTES
Assumed care of pt, received bedside report from REYNA Yang. Pt sitting up in bed, no complaints of pain, room air, A/Ox4. Fall and safety precautions in place, strip alarm in place. Discussed POC with pt, pt verbalizes understanding. No further needs at this time.

## 2020-07-19 NOTE — THERAPY
Occupational Therapy   Initial Evaluation     Patient Name: Timothy Dahl  Age:  59 y.o., Sex:  male  Medical Record #: [id number]  Today's Date: 7/19/2020          Assessment  Patient is 59 y.o. male with a diagnosis of right LE wound, cellulitis.  Additional factors influencing patient status / progress: homelessness, limited community support.      Plan    Recommend Occupational Therapy for Evaluation only for the following treatments:  NA.    Discharge recommendations:  Anticipate that the patient will have no further occupational therapy needs after discharge from the hospital.     Subjective    Pleasant, cooperative throughout. Appears to be at functional baseline. No skilled OT needs identified     Objective       07/19/20 0915   Prior Living Situation   Prior Services None   Housing / Facility Other (Comments)  (van)   Steps Into Home 0   Steps In Home 0   Elevator No   Lives with - Patient's Self Care Capacity Alone and Able to Care For Self   Prior Level of ADL Function   Self Feeding Independent   Grooming / Hygiene Independent   Bathing Independent   Dressing Independent   Toileting Independent   Prior Level of IADL Function   Medication Management Independent   Laundry Independent   Kitchen Mobility Independent   Finances Independent   Home Management Independent   Shopping Independent   Prior Level Of Mobility Independent Without Device in Community   Driving / Transportation Driving Independent   Vitals   O2 Delivery Device None - Room Air   Pain 0 - 10 Group   Therapist Pain Assessment Post Activity Pain Same as Prior to Activity;Nurse Notified;0   Cognition    Cognition / Consciousness WDL   Passive ROM Upper Body   Passive ROM Upper Body WDL   Active ROM Upper Body   Active ROM Upper Body  WDL   Dominant Hand Right   Strength Upper Body   Upper Body Strength  WDL   Sensation Upper Body   Upper Extremity Sensation  WDL   Upper Body Muscle Tone   Upper Body Muscle Tone  WDL   Coordination Upper  Body   Coordination WDL   Balance Assessment   Sitting Balance (Static) Good   Sitting Balance (Dynamic) Good   Standing Balance (Static) Fair +   Standing Balance (Dynamic) Fair   Weight Shift Sitting Good   Weight Shift Standing Fair   Bed Mobility    Supine to Sit Supervised   Sit to Supine Supervised   Scooting Supervised   How much help from another person does the patient currently need...   Putting on and taking off regular lower body clothing? 4   Bathing (including washing, rinsing, and drying)? 3   Toileting, which includes using a toilet, bedpan, or urinal? 4   Putting on and taking off regular upper body clothing? 4   Taking care of personal grooming such as brushing teeth? 4   Eating meals? 4   6 Clicks Daily Activity Score 23   Functional Mobility   Sit to Stand Supervised   Visual Perception   Visual Perception  WDL   Education Group   Role of Occupational Therapist Patient Response Patient;Acceptance;Explanation;Demonstration;Verbal Demonstration;Action Demonstration   Anticipated Discharge Equipment   DC Equipment None   Interdisciplinary Plan of Care Collaboration   IDT Collaboration with  Nursing;Physical Therapist   Patient Position at End of Therapy In Bed;Bed Alarm On;Call Light within Reach;Tray Table within Reach;Phone within Reach   Collaboration Comments RN aware of functional level, tolerance, participation   Session Information   Date / Session Number  7/19,1/1

## 2020-07-19 NOTE — PROGRESS NOTES
" [facility]  PROGRESS NOTE    HPI: Timothy Dahl is a 59 y.o.  with a past medical history that includes tobacco use and homelessness admitted 7/17/2020 for Cellulitis and  Sepsis (HCC).   LPS has been consulted for evaluation right medial lower leg wound.     The patient reports wound to right lower extremity since approximately March 2020.  He reports that wound initially started as \"a bunch of little pimples, that became erythematous, edematous and intermittently drained yellow purulent fluid.  He states that approximately April 2020, he scraped his right lower leg on pavement while working on a car and since his wound has progressively gotten worse.  He reports pain to his wound that occasionally radiates to his right thigh, unchanged swelling and redness.  He describes the pain as \"like [his] muscles are twisting\".  No relieving or exacerbating factors.  Per epic, he was seen him in the emergency department on 5/24/2020 for a wound check, diagnosis cellulitis, and discharged home on oral clindamycin which he did not take.  He states that in the meantime, he has been cleaning his wound with warm water and a rag.  However, the patient developed fatigue and fevers 7/17/2020 which prompted him to seek care in emergency department.  Emergency department, patient is met sepsis protocol, CT of his right leg was obtained and was negative for abscess and right lower extremity.  But did show changes related to cellulitis and right inguinal adenopathy.  IV antibiotics were started on this admission.  Infectious diseases has not been consulted.    Ortho has not been consulted yet.  X-ray was not obtained to assess for osteomyelitis.      INTERVAL HISTORY:  7/19/2020: Patient denies fevers, chills, nausea, vomiting.  Pain well controlled.       PERTINENT LPS RESULTS:   COVID-19: n/a     7/17/2020 21:30   Sed Rate Westergren 3      7/17/2020 21:30   Stat C-Reactive Protein 2.55 (H)       X-ray: None of " "lower extremity obtained this admission     MRI: None     CT:  CT-extremity, lower with right 7/17/2020     IMPRESSION:        1.  Changes of cellulitis, no focal enhancing abscess identified.  2.  Right inguinal adenopathy  3.  Retracted versus cryptorchid right testicle, correlate with exam. Urologic referral as clinically age-appropriate  4.  Diverticulosis        Arterial studies: None      EXAM:      /86   Pulse 62   Temp 36.4 °C (97.5 °F) (Temporal)   Resp 18   Ht 1.905 m (6' 3\")   Wt 122.4 kg (269 lb 13.5 oz)   SpO2 96%   BMI 33.73 kg/m²     Pedal Pulses: palpable DP/PT pulses bilaterally. Biphasic DP/PT tones heard via doppler bilaterally  2+ edema to RLE, 1+ edema to LLE  Sensation: decreased sensation to light touch.     Wound :  Right medial lower extremity has dry, skin with chronic appearing scabbing present. Erythema appears to be receding from line of demarcation. Scabbed area is tender with palpation.  Post-debridement of scabbing revealed full thickness wound with pink, moist wound bed. Edges are attached.   No drainage or odor.       RLE pre-debridement      -2% viscous lidocaine applied topically to wound bed for approximately 5 minutes prior to debridement patient was also premedicated with morphine IV.  -Scalpel and Forceps used to debride scabbed tissue and wound bed.  Excisional debridement was performed to remove devitalized tissue until healthy, bleeding tissue was visualized.   Entire surface of wound, 29.4cm², debrided  Tissue debrided into the subcutaneous layer.   -Bleeding controlled with manual pressure  -Wound care completed per orders, by [facility] PT.     RLE post-debridement          Wound Care: Viscopaste patch to wound bed and Unnas Boot applied. To be managed by wound care team.      DIABETES MANAGEMENT:    Blood glucose:   Results from last 7 days   Lab Units 07/17/20  2111   ACCU CHECK GLUCOSE 788 mg/dL 162*     A1c:   Lab Results   Component Value " "Date/Time    HBA1C 6.9 (H) 07/18/2020 04:58 AM            INFECTION MANAGEMENT:    Results from last 7 days   Lab Units 07/19/20  0319 07/17/20 2130   WBC 1501 K/uL 7.1 18.5*   PLATELET COUNT 1518 K/uL 143* 205     Wound culture results:   Results     Procedure Component Value Units Date/Time    URINALYSIS [[id number]]  (Abnormal) Collected:  07/19/20 0446    Order Status:  Completed Specimen:  Urine, Clean Catch Updated:  07/19/20 0500     Color Yellow     Character Clear     Specific Gravity 1.018     Ph 6.0     Glucose Negative mg/dL      Ketones Negative mg/dL      Protein Negative mg/dL      Bilirubin Negative     Urobilinogen, Urine 1.0     Nitrite Negative     Leukocyte Esterase Negative     Occult Blood Trace     Micro Urine Req Microscopic    Narrative:       Collected By:[id number] ELY MOTT    BLOOD CULTURE [[id number]] Collected:  07/17/20 2130    Order Status:  Completed Specimen:  Blood from Peripheral Updated:  07/18/20 0724     Significant Indicator NEG     Source BLD     Site PERIPHERAL     Culture Result No Growth  Note: Blood cultures are incubated for 5 days and  are monitored continuously.Positive blood cultures  are called to the RN and reported as soon as  they are identified.      Narrative:       Per Hospital Policy: Only change Specimen Src: to \"Line\" if  specified by physician order.  No site indicated    CULTURE WOUND W/ GRAM STAIN [[id number]]     Order Status:  Sent Specimen:  Wound from Right Leg     COVID/SARS CoV-2 PCR [[id number]]     Order Status:  Completed Specimen:  Respirate from Nasopharyngeal     S. Aureus By PCR, Nasal Complete [[id number]]     Order Status:  Sent Specimen:  Respirate from Respiratory     URINALYSIS [[id number]]  (Abnormal) Collected:  07/17/20 2330    Order Status:  Completed Specimen:  Urine, Clean Catch Updated:  07/17/20 2355     Color Yellow     Character Clear     Specific Gravity 1.010     Ph 5.0     Glucose Negative mg/dL      Ketones Negative mg/dL    "     Protein Negative mg/dL      Bilirubin Negative     Urobilinogen, Urine 0.2     Nitrite Negative     Leukocyte Esterase Negative     Occult Blood Small     Micro Urine Req Microscopic    Narrative:       Indication for culture:->Septic Shock: Persistent  hypotension,  Lactic acid > 4, vasopressors/inotropes started    URINE CULTURE(NEW) [[id number]] Collected:  07/17/20 2330    Order Status:  Completed Specimen:  Urine, Clean Catch Updated:  07/17/20 2341    Narrative:       Indication for culture:->Septic Shock: Persistent  hypotension,  Lactic acid > 4, vasopressors/inotropes started    BLOOD CULTURE [[id number]]     Order Status:  Sent Specimen:  Blood from Peripheral            ASSESSMENT/PLAN:   Right medial leg wound had chronic appearing scabbing surrounded by dry, flaky skin. Previous honey colloid dressing softened scabbing and along with analgesic facilitated in the debridement of scabbed tissue. Full thickness wound noted with pink moist wound bed. No drainage or odor noted. Findings continue to be consistent with cellulitis. Wound care completed by [facility] and Unnas boot applied to decrease edema. Wound anticipated to resolve with wound care therapy and antibiotics.      Wound care:   -wound care orders placed for nursing.   To be managed by Wound Care team.     Labs/Imaging:  -COVID-19: n/a  -no further labs or imaging at this time    Vascular status:   -palpable DP/PT pulses bilaterally. Biphasic DP/PT tones heard via doppler bilaterally    Surgery:   -none planned at this time    Antibiotics:   -ID: not involved  Unasyn IV, managed by hospitalist team.    Weight Bearing Status:   -Weight bearing as tolerated BLE    Offloading:   None at this time. Patient ambulatory.   Rx for diabetic shoes and inserts provided.     PT/OT :   -Consult placed by hospitalist team on 7/18/2020.  Seen by OT 7/18/2020    Diabetes Education:   -consult plaecd 7/18/2020  - Implications of loss of protective  sensation (LOPS) discussed with patient- including increased risk for amputation.  Advised to check feet at least daily, moisturize feet, and to always wear protective foot wear.   -avoid trimming own nails. See podiatrist or certified foot and nail RN  -keep blood sugars <150 for improved wound healing      DISCHARGE PLAN:    Disposition: patient to be treated with antibiotic therapy and wound care. Wound care team to take over management of wound. LPS to sign off. Please re-consult for any further concerns.     Follow-up: OP Wound Clinic referral placed 7/18/2020.       Discussed with: pt, Dr. Corona, [facility] RN, [facility].     Please note that this dictation was created using voice recognition software. I have  worked with technical experts from [employer] to optimize the interface.  I have made every reasonable attempt to correct obvious errors, but there may be errors of grammar and possibly content that I did not discover before finalizing the note.    Delisa Chan, A.P.R.N.    If any questions or concerns, please call [phone]

## 2020-07-19 NOTE — PROGRESS NOTES
Daily Progress Note:     Date of Service: 7/19/2020  Primary Team: [facility] IM Red Team    Attending: Natasha Maynard M.D.  Senior Resident: CYNDY Corona M.D.  Contact:  [phone]    Chief Complaint:   RLE Cellulitis    ID:   59M admitted 7/17 w/ RLE cellulitis    Subjective:   No acute events overnight  Patient woken from sleep this morning, not in pain or discomfort  Denies fevers, chills, shortness of breath, or chest pain  Hemodynamically stable  Tolerating regular diet w/o N/V  Progressing well  Interval reduction in WBC  Stable to transfer to floor without telemetry    Consultants/Specialty:  Wound care    Review of Systems:  Review of Systems   Constitutional: Negative for chills and fever (resolved).   HENT: Negative for congestion and sore throat.    Eyes: Negative for pain and redness.   Respiratory: Negative for hemoptysis and shortness of breath.    Cardiovascular: Negative for chest pain and palpitations.   Gastrointestinal: Negative for nausea and vomiting.   Genitourinary: Positive for frequency and urgency. Hematuria: microscopic.   Musculoskeletal: Negative for back pain and falls.   Neurological: Negative for dizziness, weakness and headaches.   Endo/Heme/Allergies: Positive for polydipsia.   Psychiatric/Behavioral: The patient is not nervous/anxious and does not have insomnia.      Objective Data:   Physical Exam:   Vitals:   Temp:  [35.8 °C (96.5 °F)-37.2 °C (98.9 °F)] 37.2 °C (98.9 °F)  Pulse:  [53-64] 64  Resp:  [16-18] 18  BP: (109-135)/(57-88) 134/88  SpO2:  [90 %-98 %] 96 %     Physical Exam  Vitals signs and nursing note reviewed.   Constitutional:       Appearance: Normal appearance.      Comments: Disheveled; unkempt   HENT:      Head: Normocephalic and atraumatic.      Right Ear: External ear normal.      Left Ear: External ear normal.      Nose: Nose normal.      Mouth/Throat:      Mouth: Mucous membranes are moist.      Pharynx: Oropharynx is clear.   Eyes:      Extraocular  Movements: Extraocular movements intact.      Conjunctiva/sclera: Conjunctivae normal.      Pupils: Pupils are equal, round, and reactive to light.   Neck:      Musculoskeletal: Normal range of motion and neck supple.   Cardiovascular:      Rate and Rhythm: Normal rate and regular rhythm.      Pulses: Normal pulses.      Heart sounds: Normal heart sounds.   Pulmonary:      Effort: Pulmonary effort is normal.      Breath sounds: Normal breath sounds.   Abdominal:      General: Abdomen is flat. There is no distension.      Palpations: Abdomen is soft.      Tenderness: There is no abdominal tenderness.   Musculoskeletal:         General: Signs of injury present.      Right lower leg: No edema.      Left lower leg: No edema.      Comments: Dry flaky wound above RIGHT ankle. Non-tender. Non-purulent. Not bleeding.    Skin:     General: Skin is warm and dry.      Capillary Refill: Capillary refill takes less than 2 seconds.   Neurological:      General: No focal deficit present.      Mental Status: He is alert and oriented to person, place, and time. Mental status is at baseline.   Psychiatric:         Mood and Affect: Mood normal.         Behavior: Behavior normal.     Labs:   Most Recent Labs:    Lab Results   Component Value Date/Time    WBC 7.1 07/19/2020 03:19 AM    RBC 4.91 07/19/2020 03:19 AM    HEMOGLOBIN 15.0 07/19/2020 03:19 AM    HEMATOCRIT 46.5 07/19/2020 03:19 AM    MCV 94.7 07/19/2020 03:19 AM    MCH 30.5 07/19/2020 03:19 AM    MCHC 32.3 (L) 07/19/2020 03:19 AM    MPV 10.2 07/19/2020 03:19 AM    NEUTSPOLYS 67.60 07/19/2020 03:19 AM    LYMPHOCYTES 18.30 (L) 07/19/2020 03:19 AM    MONOCYTES 10.70 07/19/2020 03:19 AM    EOSINOPHILS 2.40 07/19/2020 03:19 AM    BASOPHILS 0.40 07/19/2020 03:19 AM      Lab Results   Component Value Date/Time    SODIUM 137 07/19/2020 03:19 AM    POTASSIUM 4.2 07/19/2020 03:19 AM    CHLORIDE 102 07/19/2020 03:19 AM    CO2 28 07/19/2020 03:19 AM    GLUCOSE 123 (H) 07/19/2020 03:19 AM     BUN 16 07/19/2020 03:19 AM    CREATININE 1.14 07/19/2020 03:19 AM      Lab Results   Component Value Date/Time    ALTSGPT 29 07/19/2020 03:19 AM    ASTSGOT 24 07/19/2020 03:19 AM    ALKPHOSPHAT 99 07/19/2020 03:19 AM    TBILIRUBIN 0.3 07/19/2020 03:19 AM    LIPASE 26 10/28/2017 08:55 AM    ALBUMIN 2.9 (L) 07/19/2020 03:19 AM    GLOBULIN 3.2 07/19/2020 03:19 AM     Imaging:   CT-EXTREMITY, LOWER WITH RIGHT   Final Result         1.  Changes of cellulitis, no focal enhancing abscess identified.   2.  Right inguinal adenopathy   3.  Retracted versus cryptorchid right testicle, correlate with exam. Urologic referral as clinically age-appropriate   4.  Diverticulosis      DX-CHEST-PORTABLE (1 VIEW)   Final Result         1.  No acute cardiopulmonary disease.        * Sepsis ([facility])  Assessment & Plan  This is Sepsis Present on admission  SIRS criteria identified on my evaluation include: Fever, with temperature greater than 101 deg F, Tachycardia, with heart rate greater than 90 BPM, Tachypnea, with respirations greater than 20 per minute and Leukocytosis, with WBC greater than 12,000  Source is RLE cellulitis  Sepsis protocol initiated  Fluid resuscitation ordered per protocol  IV antibiotics as appropriate for source of sepsis  While organ dysfunction may be noted elsewhere in this problem list or in the chart, degree of organ dysfunction does not meet CMS criteria for severe sepsis    CT right lower extremity shows diffuse subcutaneous edema below the knee and is general dryness negative for drainable abscess  Responding well to Unasyn, WBC trending down  Blood Cx NGTD  Unable to express drainage from RLE wound to send for culture    Plan  - Continue Unasyn   - Wound care  - Diabetes optimization, start Metformin  - Trend blood cultures  - Discontinue IV fluids  - Lovenox DVT PPx    Type 2 diabetes mellitus with skin complication (HCC)  Assessment & Plan  Hemoglobin A1c 6.9  Contributing to poor RLE wound  healing  Symptomatic with polydypsia, polyuria, and polyphagia w/ positive family history  Serum glucose <140 since admission w/o insulin    Plan  - Metformin 500mg BID  - Diabetes education  - Outpatient follow up with PCP    Asymptomatic microscopic hematuria  Assessment & Plan  Abnormal UA w/ 2-5 RBCs   No protein  Consistent w/ nonglomerular hematuria    Plan:  - Outpatient follow up with PCP    Tobacco abuse  Assessment & Plan  3/4 to 1 ppd for 40+ years     Plan  - Tobacco cessation counseling provided  - Low dose CT chest lung cancer screening per PCP outpatient    Homelessness  Assessment & Plan  Lives in [place]    Plan  - social work consulted    Undescended right testicle  Assessment & Plan  CT RLE noted for right testicle in right inguinal canal suggesting retracted versus cryptorchid  Not urological complaints    Plan  - Outpatient urologic follow up

## 2020-07-19 NOTE — WOUND TEAM
Assisted Delisa HERBERT with treatment of right leg wound.  Sharp debrided with forceps < 20 cm dry slough from medial aspect of mid leg.  Wrapped leg in unna boot.  Doppler pulses 3+ right pedal.  See Delisa's note for details.

## 2020-07-20 PROBLEM — L03.119 LOWER EXTREMITY CELLULITIS: Status: ACTIVE | Noted: 2020-07-20

## 2020-07-20 LAB
ANION GAP SERPL CALC-SCNC: 11 MMOL/L (ref 7–16)
BACTERIA UR CULT: NORMAL
BASOPHILS # BLD AUTO: 0.9 % (ref 0–1.8)
BASOPHILS # BLD: 0.05 K/UL (ref 0–0.12)
BUN SERPL-MCNC: 14 MG/DL (ref 8–22)
CALCIUM SERPL-MCNC: 8.4 MG/DL (ref 8.5–10.5)
CHLORIDE SERPL-SCNC: 104 MMOL/L (ref 96–112)
CO2 SERPL-SCNC: 24 MMOL/L (ref 20–33)
CREAT SERPL-MCNC: 0.95 MG/DL (ref 0.5–1.4)
EOSINOPHIL # BLD AUTO: 0.24 K/UL (ref 0–0.51)
EOSINOPHIL NFR BLD: 4.4 % (ref 0–6.9)
ERYTHROCYTE [DISTWIDTH] IN BLOOD BY AUTOMATED COUNT: 46.1 FL (ref 35.9–50)
GLUCOSE SERPL-MCNC: 109 MG/DL (ref 65–99)
HCT VFR BLD AUTO: 46.2 % (ref 42–52)
HGB BLD-MCNC: 15.2 G/DL (ref 14–18)
LYMPHOCYTES # BLD AUTO: 1.37 K/UL (ref 1–4.8)
LYMPHOCYTES NFR BLD: 25.4 % (ref 22–41)
MAGNESIUM SERPL-MCNC: 2 MG/DL (ref 1.5–2.5)
MANUAL DIFF BLD: NORMAL
MCH RBC QN AUTO: 30.8 PG (ref 27–33)
MCHC RBC AUTO-ENTMCNC: 32.9 G/DL (ref 33.7–35.3)
MCV RBC AUTO: 93.5 FL (ref 81.4–97.8)
MONOCYTES # BLD AUTO: 0.38 K/UL (ref 0–0.85)
MONOCYTES NFR BLD AUTO: 7 % (ref 0–13.4)
MORPHOLOGY BLD-IMP: NORMAL
NEUTROPHILS # BLD AUTO: 3.36 K/UL (ref 1.82–7.42)
NEUTROPHILS NFR BLD: 62.3 % (ref 44–72)
NRBC # BLD AUTO: 0 K/UL
NRBC BLD-RTO: 0 /100 WBC
PLATELET # BLD AUTO: 158 K/UL (ref 164–446)
PLATELET BLD QL SMEAR: NORMAL
PMV BLD AUTO: 10.5 FL (ref 9–12.9)
POTASSIUM SERPL-SCNC: 3.9 MMOL/L (ref 3.6–5.5)
RBC # BLD AUTO: 4.94 M/UL (ref 4.7–6.1)
RBC BLD AUTO: NORMAL
SIGNIFICANT IND 70042: NORMAL
SITE SITE: NORMAL
SODIUM SERPL-SCNC: 139 MMOL/L (ref 135–145)
SOURCE SOURCE: NORMAL
WBC # BLD AUTO: 5.4 K/UL (ref 4.8–10.8)

## 2020-07-20 PROCEDURE — 85027 COMPLETE CBC AUTOMATED: CPT

## 2020-07-20 PROCEDURE — 36415 COLL VENOUS BLD VENIPUNCTURE: CPT

## 2020-07-20 PROCEDURE — 700111 HCHG RX REV CODE 636 W/ 250 OVERRIDE (IP): Performed by: STUDENT IN AN ORGANIZED HEALTH CARE EDUCATION/TRAINING PROGRAM

## 2020-07-20 PROCEDURE — 770006 HCHG ROOM/CARE - MED/SURG/GYN SEMI*

## 2020-07-20 PROCEDURE — A9270 NON-COVERED ITEM OR SERVICE: HCPCS | Performed by: STUDENT IN AN ORGANIZED HEALTH CARE EDUCATION/TRAINING PROGRAM

## 2020-07-20 PROCEDURE — 700105 HCHG RX REV CODE 258: Performed by: STUDENT IN AN ORGANIZED HEALTH CARE EDUCATION/TRAINING PROGRAM

## 2020-07-20 PROCEDURE — 85007 BL SMEAR W/DIFF WBC COUNT: CPT

## 2020-07-20 PROCEDURE — 83735 ASSAY OF MAGNESIUM: CPT

## 2020-07-20 PROCEDURE — 80048 BASIC METABOLIC PNL TOTAL CA: CPT

## 2020-07-20 PROCEDURE — 700102 HCHG RX REV CODE 250 W/ 637 OVERRIDE(OP): Performed by: STUDENT IN AN ORGANIZED HEALTH CARE EDUCATION/TRAINING PROGRAM

## 2020-07-20 RX ADMIN — ACETAMINOPHEN 1000 MG: 500 TABLET ORAL at 05:00

## 2020-07-20 RX ADMIN — METFORMIN HYDROCHLORIDE 500 MG: 500 TABLET ORAL at 07:31

## 2020-07-20 RX ADMIN — SODIUM CHLORIDE 3 G: 900 INJECTION INTRAVENOUS at 12:13

## 2020-07-20 RX ADMIN — SODIUM CHLORIDE 3 G: 900 INJECTION INTRAVENOUS at 05:00

## 2020-07-20 RX ADMIN — SODIUM CHLORIDE 3 G: 900 INJECTION INTRAVENOUS at 23:11

## 2020-07-20 RX ADMIN — SODIUM CHLORIDE 3 G: 900 INJECTION INTRAVENOUS at 00:37

## 2020-07-20 RX ADMIN — ENOXAPARIN SODIUM 40 MG: 40 INJECTION SUBCUTANEOUS at 05:00

## 2020-07-20 RX ADMIN — METFORMIN HYDROCHLORIDE 500 MG: 500 TABLET ORAL at 16:34

## 2020-07-20 RX ADMIN — ACETAMINOPHEN 1000 MG: 500 TABLET ORAL at 12:13

## 2020-07-20 RX ADMIN — ACETAMINOPHEN 1000 MG: 500 TABLET ORAL at 16:34

## 2020-07-20 RX ADMIN — SODIUM CHLORIDE 3 G: 900 INJECTION INTRAVENOUS at 16:34

## 2020-07-20 ASSESSMENT — ENCOUNTER SYMPTOMS
DEPRESSION: 0
WEAKNESS: 0
HEADACHES: 0
EYE PAIN: 0
NERVOUS/ANXIOUS: 0
PALPITATIONS: 0
SHORTNESS OF BREATH: 0
POLYDIPSIA: 0
SORE THROAT: 0
SENSORY CHANGE: 0
VOMITING: 0
NAUSEA: 0
DIZZINESS: 0
CHILLS: 0
HEMOPTYSIS: 0
FEVER: 0
FALLS: 0
EYE REDNESS: 0
BACK PAIN: 0
INSOMNIA: 0

## 2020-07-20 ASSESSMENT — FIBROSIS 4 INDEX: FIB4 SCORE: 1.69

## 2020-07-20 NOTE — DOCUMENTATION QUERY
[facility]                                                                       Query Response Note      PATIENT:               ZAHIDA GARRIDO  ACCT #:                  [id number]  MRN:                     [id number]  :                      1960  ADMIT DATE:       2020 9:16 PM  DISCH DATE:          RESPONDING  PROVIDER #:        [id number]           QUERY TEXT:    Na 130 is noted in the  Lab Results.  Can a diagnosis be specified to support this finding?    NOTE:  If an appropriate response is not listed below, please respond with a new note.    The patient's Clinical Indicators include:   Na 130  Risk Factors: polyuria, polydipsia   Treatment: IV NS  Options provided:   -- Hyponatremia   -- Findings are clinically insignificant   -- Unable to determine      Query created by: Judy Tatum on 2020 8:53 AM    RESPONSE TEXT:    Hyponatremia       QUERY TEXT:    Lactic acid 2.6 is noted in the  Lab Results.  Can a diagnosis be provided to support this finding?    NOTE: if an appropriate response is not listed below, please respond with a new note    The patient's Clinical Indicators include:   Lactic acid 2.6, 2.1  Risk Factors: sepsis  Treatment: IVF  Options provided:   -- Lactic acidosis   -- Findings of no clinical significance   -- Unable to determine      Query created by: Judy Tatum on 2020 8:55 AM    RESPONSE TEXT:    Lactic acidosis          Electronically signed by:  TITUS STEARNS MD 2020 2:21 PM

## 2020-07-20 NOTE — PROGRESS NOTES
Bedside report received, bed locked and in low position. Call light within reach, hourly rounding in place. No needs at this time.

## 2020-07-20 NOTE — WOUND TEAM
Wound team has seen patient with LINDA SANDOVAL on 7/19.  Dressing are in placed and intact, wound team will follow-up later this week.  Patient has no needs at this time of visit.

## 2020-07-20 NOTE — PROGRESS NOTES
Daily Progress Note:     Date of Service: 7/20/2020  Primary Team: [facility] Red Team    Attending: Natasha Maynard M.D.  Senior Resident: Garrison Cheung MD  Contact:  [phone]    Chief Complaint:   RLE Cellulitis    ID:   59 years old homeless male admitted due to RLE cellulitis     Subjective:   No overnight events. Patient is feeling better today. No fever or chills, no sensation loss or weakness on lower extremities. Comfortably lying on the bed.    Interval update:  - WBC wnl without any bands. Will continue unasyn for now. Blood cultures negative to date.  - Wound care changed dressings, more clear today.    Disposition: Home with outpatient wound care.    Consultants/Specialty:  Wound care    Review of Systems:  Review of Systems   Constitutional: Negative for chills and fever.   HENT: Negative for congestion and sore throat.    Eyes: Negative for pain and redness.   Respiratory: Negative for hemoptysis and shortness of breath.    Cardiovascular: Negative for chest pain and palpitations.   Gastrointestinal: Negative for nausea and vomiting.   Genitourinary: Positive for urgency. Negative for frequency.   Musculoskeletal: Negative for back pain and falls.   Neurological: Negative for dizziness, sensory change, weakness and headaches.   Endo/Heme/Allergies: Negative for polydipsia.   Psychiatric/Behavioral: Negative for depression. The patient is not nervous/anxious and does not have insomnia.      Objective Data:   Physical Exam:   Vitals:   Temp:  [36.1 °C (96.9 °F)-36.4 °C (97.6 °F)] 36.2 °C (97.1 °F)  Pulse:  [58-67] 60  Resp:  [14-18] 18  BP: (120-166)/(77-90) 166/87  SpO2:  [91 %-96 %] 96 %     Physical Exam  Vitals signs and nursing note reviewed.   Constitutional:       Appearance: Normal appearance.      Comments: Disheveled; unkempt   HENT:      Head: Normocephalic and atraumatic.      Right Ear: External ear normal.      Left Ear: External ear normal.      Nose: Nose normal. No rhinorrhea.       Mouth/Throat:      Mouth: Mucous membranes are moist.      Pharynx: Oropharynx is clear.   Eyes:      General: No scleral icterus.     Extraocular Movements: Extraocular movements intact.      Conjunctiva/sclera: Conjunctivae normal.      Pupils: Pupils are equal, round, and reactive to light.   Neck:      Musculoskeletal: Normal range of motion and neck supple.   Cardiovascular:      Rate and Rhythm: Normal rate and regular rhythm.      Pulses: Normal pulses.      Heart sounds: Normal heart sounds.   Pulmonary:      Effort: Pulmonary effort is normal.      Breath sounds: Normal breath sounds.   Abdominal:      General: Abdomen is flat. There is no distension.      Palpations: Abdomen is soft.      Tenderness: There is no abdominal tenderness.   Musculoskeletal:         General: Signs of injury (likely an entry point) present.      Right lower leg: No edema.      Left lower leg: No edema.      Comments: Wounds are wrapped. Distal pulses are palpable, no sensory deficit.   Skin:     General: Skin is warm and dry.      Capillary Refill: Capillary refill takes less than 2 seconds.   Neurological:      General: No focal deficit present.      Mental Status: He is alert and oriented to person, place, and time. Mental status is at baseline.   Psychiatric:         Mood and Affect: Mood normal.         Behavior: Behavior normal.     Labs:   Most Recent Labs:    Lab Results   Component Value Date/Time    WBC 5.4 07/20/2020 03:13 AM    RBC 4.94 07/20/2020 03:13 AM    HEMOGLOBIN 15.2 07/20/2020 03:13 AM    HEMATOCRIT 46.2 07/20/2020 03:13 AM    MCV 93.5 07/20/2020 03:13 AM    MCH 30.8 07/20/2020 03:13 AM    MCHC 32.9 (L) 07/20/2020 03:13 AM    MPV 10.5 07/20/2020 03:13 AM    NEUTSPOLYS 62.30 07/20/2020 03:13 AM    LYMPHOCYTES 25.40 07/20/2020 03:13 AM    MONOCYTES 7.00 07/20/2020 03:13 AM    EOSINOPHILS 4.40 07/20/2020 03:13 AM    BASOPHILS 0.90 07/20/2020 03:13 AM      Lab Results   Component Value Date/Time    SODIUM 139  07/20/2020 03:13 AM    POTASSIUM 3.9 07/20/2020 03:13 AM    CHLORIDE 104 07/20/2020 03:13 AM    CO2 24 07/20/2020 03:13 AM    GLUCOSE 109 (H) 07/20/2020 03:13 AM    BUN 14 07/20/2020 03:13 AM    CREATININE 0.95 07/20/2020 03:13 AM      Lab Results   Component Value Date/Time    ALTSGPT 29 07/19/2020 03:19 AM    ASTSGOT 24 07/19/2020 03:19 AM    ALKPHOSPHAT 99 07/19/2020 03:19 AM    TBILIRUBIN 0.3 07/19/2020 03:19 AM    LIPASE 26 10/28/2017 08:55 AM    ALBUMIN 2.9 (L) 07/19/2020 03:19 AM    GLOBULIN 3.2 07/19/2020 03:19 AM     Imaging:   CT-EXTREMITY, LOWER WITH RIGHT   Final Result         1.  Changes of cellulitis, no focal enhancing abscess identified.   2.  Right inguinal adenopathy   3.  Retracted versus cryptorchid right testicle, correlate with exam. Urologic referral as clinically age-appropriate   4.  Diverticulosis      DX-CHEST-PORTABLE (1 VIEW)   Final Result         1.  No acute cardiopulmonary disease.        * Lower extremity cellulitis- (present on admission)  Assessment & Plan  Blood cultures negative to date.  Entry wound+  Responding to unasyn, low threshold for MRSA coverage.  Wound care following.  Patient will need outpatient wound care.    Sepsis (HCC)  Assessment & Plan  Sepsis resolved.  This is Sepsis Present on admission  SIRS criteria identified on my evaluation include: Fever, with temperature greater than 101 deg F, Tachycardia, with heart rate greater than 90 BPM, Tachypnea, with respirations greater than 20 per minute and Leukocytosis, with WBC greater than 12,000  Source is RLE cellulitis  Sepsis protocol initiated  Fluid resuscitation ordered per protocol  IV antibiotics as appropriate for source of sepsis  While organ dysfunction may be noted elsewhere in this problem list or in the chart, degree of organ dysfunction does not meet CMS criteria for severe sepsis  CT right lower extremity shows diffuse subcutaneous edema below the knee and is general dryness negative for drainable  abscess    Plan  - Continue Unasyn   - Wound care   - Blood Cx NGTD    Type 2 diabetes mellitus with skin complication (HCC)  Assessment & Plan  Hemoglobin A1c 6.9  Contributing to poor RLE wound healing  Symptomatic with polydypsia, polyuria, and polyphagia w/ positive family history    - Metformin 500mg BID  - Diabetes education  - Outpatient follow up with PCP    Asymptomatic microscopic hematuria  Assessment & Plan  Abnormal UA w/ 2-5 RBCs   No protein  Consistent w/ nonglomerular hematuria  - Outpatient follow up with PCP    Tobacco abuse  Assessment & Plan  3/4 to 1 ppd for 40+ years   - Tobacco cessation counseling provided  - Low dose CT chest lung cancer screening per PCP outpatient    Homelessness  Assessment & Plan  Lives in van  - social work following    Undescended right testicle  Assessment & Plan  CT RLE noted for right testicle in right inguinal canal suggesting retracted versus cryptorchid  Not urological complaints  - Outpatient urologic follow up

## 2020-07-21 ENCOUNTER — PATIENT OUTREACH (OUTPATIENT)
Dept: HEALTH INFORMATION MANAGEMENT | Facility: OTHER | Age: 60
End: 2020-07-21

## 2020-07-21 VITALS
BODY MASS INDEX: 31.63 KG/M2 | WEIGHT: 254.41 LBS | DIASTOLIC BLOOD PRESSURE: 85 MMHG | HEIGHT: 75 IN | RESPIRATION RATE: 20 BRPM | SYSTOLIC BLOOD PRESSURE: 136 MMHG | HEART RATE: 58 BPM | TEMPERATURE: 97.4 F | OXYGEN SATURATION: 94 %

## 2020-07-21 LAB
ALBUMIN SERPL BCP-MCNC: 3.4 G/DL (ref 3.2–4.9)
ALBUMIN/GLOB SERPL: 1 G/DL
ALP SERPL-CCNC: 103 U/L (ref 30–99)
ALT SERPL-CCNC: 34 U/L (ref 2–50)
ANION GAP SERPL CALC-SCNC: 11 MMOL/L (ref 7–16)
AST SERPL-CCNC: 32 U/L (ref 12–45)
BASOPHILS # BLD AUTO: 0.7 % (ref 0–1.8)
BASOPHILS # BLD: 0.04 K/UL (ref 0–0.12)
BILIRUB SERPL-MCNC: 0.4 MG/DL (ref 0.1–1.5)
BUN SERPL-MCNC: 13 MG/DL (ref 8–22)
CALCIUM SERPL-MCNC: 8.9 MG/DL (ref 8.5–10.5)
CHLORIDE SERPL-SCNC: 101 MMOL/L (ref 96–112)
CO2 SERPL-SCNC: 22 MMOL/L (ref 20–33)
CREAT SERPL-MCNC: 0.87 MG/DL (ref 0.5–1.4)
EOSINOPHIL # BLD AUTO: 0.2 K/UL (ref 0–0.51)
EOSINOPHIL NFR BLD: 3.3 % (ref 0–6.9)
ERYTHROCYTE [DISTWIDTH] IN BLOOD BY AUTOMATED COUNT: 45.3 FL (ref 35.9–50)
GLOBULIN SER CALC-MCNC: 3.5 G/DL (ref 1.9–3.5)
GLUCOSE SERPL-MCNC: 107 MG/DL (ref 65–99)
HCT VFR BLD AUTO: 48.5 % (ref 42–52)
HGB BLD-MCNC: 16.2 G/DL (ref 14–18)
IMM GRANULOCYTES # BLD AUTO: 0.03 K/UL (ref 0–0.11)
IMM GRANULOCYTES NFR BLD AUTO: 0.5 % (ref 0–0.9)
LYMPHOCYTES # BLD AUTO: 2.19 K/UL (ref 1–4.8)
LYMPHOCYTES NFR BLD: 36.5 % (ref 22–41)
MCH RBC QN AUTO: 30.9 PG (ref 27–33)
MCHC RBC AUTO-ENTMCNC: 33.4 G/DL (ref 33.7–35.3)
MCV RBC AUTO: 92.6 FL (ref 81.4–97.8)
MONOCYTES # BLD AUTO: 0.4 K/UL (ref 0–0.85)
MONOCYTES NFR BLD AUTO: 6.7 % (ref 0–13.4)
NEUTROPHILS # BLD AUTO: 3.14 K/UL (ref 1.82–7.42)
NEUTROPHILS NFR BLD: 52.3 % (ref 44–72)
NRBC # BLD AUTO: 0 K/UL
NRBC BLD-RTO: 0 /100 WBC
PLATELET # BLD AUTO: 186 K/UL (ref 164–446)
PMV BLD AUTO: 10 FL (ref 9–12.9)
POTASSIUM SERPL-SCNC: 4.1 MMOL/L (ref 3.6–5.5)
PROT SERPL-MCNC: 6.9 G/DL (ref 6–8.2)
RBC # BLD AUTO: 5.24 M/UL (ref 4.7–6.1)
SODIUM SERPL-SCNC: 134 MMOL/L (ref 135–145)
WBC # BLD AUTO: 6 K/UL (ref 4.8–10.8)

## 2020-07-21 PROCEDURE — 36415 COLL VENOUS BLD VENIPUNCTURE: CPT

## 2020-07-21 PROCEDURE — 99239 HOSP IP/OBS DSCHRG MGMT >30: CPT | Mod: GC | Performed by: INTERNAL MEDICINE

## 2020-07-21 PROCEDURE — 700111 HCHG RX REV CODE 636 W/ 250 OVERRIDE (IP): Performed by: STUDENT IN AN ORGANIZED HEALTH CARE EDUCATION/TRAINING PROGRAM

## 2020-07-21 PROCEDURE — 700105 HCHG RX REV CODE 258: Performed by: STUDENT IN AN ORGANIZED HEALTH CARE EDUCATION/TRAINING PROGRAM

## 2020-07-21 PROCEDURE — 80053 COMPREHEN METABOLIC PANEL: CPT

## 2020-07-21 PROCEDURE — 85025 COMPLETE CBC W/AUTO DIFF WBC: CPT

## 2020-07-21 PROCEDURE — 700102 HCHG RX REV CODE 250 W/ 637 OVERRIDE(OP): Performed by: STUDENT IN AN ORGANIZED HEALTH CARE EDUCATION/TRAINING PROGRAM

## 2020-07-21 PROCEDURE — A9270 NON-COVERED ITEM OR SERVICE: HCPCS | Performed by: STUDENT IN AN ORGANIZED HEALTH CARE EDUCATION/TRAINING PROGRAM

## 2020-07-21 RX ORDER — AMOXICILLIN AND CLAVULANATE POTASSIUM 875; 125 MG/1; MG/1
1 TABLET, FILM COATED ORAL EVERY 12 HOURS
Status: DISCONTINUED | OUTPATIENT
Start: 2020-07-21 | End: 2020-07-21 | Stop reason: HOSPADM

## 2020-07-21 RX ORDER — AMOXICILLIN AND CLAVULANATE POTASSIUM 875; 125 MG/1; MG/1
1 TABLET, FILM COATED ORAL EVERY 12 HOURS
Qty: 10 TAB | Refills: 0 | Status: SHIPPED | OUTPATIENT
Start: 2020-07-21 | End: 2020-07-26

## 2020-07-21 RX ORDER — ACETAMINOPHEN 500 MG
1000 TABLET ORAL 3 TIMES DAILY
Qty: 30 TAB | Refills: 0 | Status: SHIPPED | OUTPATIENT
Start: 2020-07-21

## 2020-07-21 RX ADMIN — AMOXICILLIN AND CLAVULANATE POTASSIUM 1 TABLET: 875; 125 TABLET, FILM COATED ORAL at 10:53

## 2020-07-21 RX ADMIN — ACETAMINOPHEN 1000 MG: 500 TABLET ORAL at 04:34

## 2020-07-21 RX ADMIN — METFORMIN HYDROCHLORIDE 500 MG: 500 TABLET ORAL at 09:37

## 2020-07-21 RX ADMIN — SODIUM CHLORIDE 3 G: 900 INJECTION INTRAVENOUS at 04:34

## 2020-07-21 RX ADMIN — ENOXAPARIN SODIUM 40 MG: 40 INJECTION SUBCUTANEOUS at 04:34

## 2020-07-21 RX ADMIN — ACETAMINOPHEN 1000 MG: 500 TABLET ORAL at 10:54

## 2020-07-21 ASSESSMENT — ENCOUNTER SYMPTOMS
SENSORY CHANGE: 0
SORE THROAT: 0
HEADACHES: 0
BACK PAIN: 0
INSOMNIA: 0
WEAKNESS: 0
POLYDIPSIA: 0
EYE PAIN: 0
FEVER: 0
DIZZINESS: 0
CHILLS: 0
DEPRESSION: 0
NAUSEA: 0
EYE REDNESS: 0
NERVOUS/ANXIOUS: 0
VOMITING: 0
PALPITATIONS: 0
HEMOPTYSIS: 0
FALLS: 0
SHORTNESS OF BREATH: 0

## 2020-07-21 NOTE — DISCHARGE PLANNING
LYNNE ORELLANA, Jamal, inquiring about requesting assistance with screening patient for insurance. REYNA LEE instructed Jamal on how to email patient financial assistance to request they screen pt. Jamal reported that she would email PFA.

## 2020-07-21 NOTE — DISCHARGE INSTRUCTIONS
Discharge Instructions    Discharged to other by car with friend. Discharged via wheelchair, hospital escort: Yes.  Special equipment needed: Not Applicable    Be sure to schedule a follow-up appointment with your primary care doctor or any specialists as instructed.     Discharge Plan:   Diet Plan: Discussed  Activity Level: Discussed  Smoking Cessation Offered: Patient Refused  Confirmed Follow up Appointment: Patient to Call and Schedule Appointment  Confirmed Symptoms Management: Discussed  Medication Reconciliation Updated: Yes    I understand that a diet low in cholesterol, fat, and sodium is recommended for good health. Unless I have been given specific instructions below for another diet, I accept this instruction as my diet prescription.   Other diet: Heart Healthy, Diabetic    Special Instructions:   Sepsis, Diagnosis, Adult  Sepsis is a serious bodily reaction to an infection. The infection that triggers sepsis may be from a bacteria, virus, or fungus. Sepsis can result from an infection in any part of your body. Infections that commonly lead to sepsis include skin, lung, and urinary tract infections.  Sepsis is a medical emergency that must be treated right away in a hospital. In severe cases, it can lead to septic shock. Septic shock can weaken your heart and cause your blood pressure to drop. This can cause your central nervous system and your body's organs to stop working.  What are the causes?  This condition is caused by a severe reaction to infections from bacteria, viruses, or fungus. The germs that most often lead to sepsis include:  · Escherichia coli (E. coli) bacteria.  · Staphylococcus aureus (staph) bacteria.  · Some types of Streptococcus bacteria.  The most common infections affect these organs:  · The lung (pneumonia).  · The kidneys or bladder (urinary tract infection).  · The skin (cellulitis).  · The bowel, gallbladder, or pancreas.  What increases the risk?  You are more likely to  develop this condition if:  · Your body's disease-fighting system (immune system) is weakened.  · You are age 65 or older.  · You are male.  · You had surgery or you have been hospitalized.  · You have these devices inserted into your body:  ? A small, thin tube (catheter).  ? IV line.  ? Breathing tube.  ? Drainage tube.  · You are not getting enough nutrients from food (malnourished).  · You have a long-term (chronic) disease, such as cancer, lung disease, kidney disease, or diabetes.  · You are .  What are the signs or symptoms?  Symptoms of this condition may include:  · Fever.  · Chills or feeling very cold.  · Confusion or anxiety.  · Fatigue.  · Muscle aches.  · Shortness of breath.  · Nausea and vomiting.  · Urinating much less than usual.  · Fast heart rate (tachycardia).  · Rapid breathing (hyperventilation).  · Changes in skin color. Your skin may look blotchy, pale, or blue.  · Cool, clammy, or sweaty skin.  · Skin rash.  Other symptoms depend on the source of your infection.  How is this diagnosed?  This condition is diagnosed based on:  · Your symptoms.  · Your medical history.  · A physical exam.  Other tests may also be done to find out the cause of the infection and how severe the sepsis is. These tests may include:  · Blood tests.  · Urine tests.  · Swabs from other areas of your body that may have an infection. These samples may be tested (cultured) to find out what type of bacteria is causing the infection.  · Chest X-ray to check for pneumonia. Other imaging tests, such as a CT scan, may also be done.  · Lumbar puncture. This removes a small amount of the fluid that surrounds your brain and spinal cord. The fluid is then examined for infection.  How is this treated?  This condition must be treated in a hospital. Based on the cause of your infection, you may be given an antibiotic, antiviral, or antifungal medicine.  You may also receive:  · Fluids through an IV.  · Oxygen and  breathing assistance.  · Medicines to increase your blood pressure.  · Kidney dialysis. This process cleans your blood if your kidneys have failed.  · Surgery to remove infected tissue.  · Blood transfusion if needed.  · Medicine to prevent blood clots.  · Nutrients to correct imbalances in basic body function (metabolism). You may:  ? Receive important salts and minerals (electrolytes) through an IV.  ? Have your blood sugar level adjusted.  Follow these instructions at home:  Medicines    · Take over-the-counter and prescription medicines only as told by your health care provider.  · If you were prescribed an antibiotic, antiviral, or antifungal medicine, take it as told by your health care provider. Do not stop taking the medicine even if you start to feel better.  General instructions  · If you have a catheter or other indwelling device, ask to have it removed as soon as possible.  · Keep all follow-up visits as told by your health care provider. This is important.  Contact a health care provider if:  · You do not feel like you are getting better or regaining strength.  · You are having trouble coping with your recovery.  · You frequently feel tired.  · You feel worse or do not seem to get better after surgery.  · You think you may have an infection after surgery.  Get help right away if:  · You have any symptoms of sepsis.  · You have difficulty breathing.  · You have a rapid or skipping heartbeat.  · You become confused or disoriented.  · You have a high fever.  · Your skin becomes blotchy, pale, or blue.  · You have an infection that is getting worse or not getting better.  These symptoms may represent a serious problem that is an emergency. Do not wait to see if the symptoms will go away. Get medical help right away. Call your local emergency services (911 in the [place] Do not drive yourself to the hospital.  Summary  · Sepsis is a medical emergency that requires immediate treatment in a hospital.  · This  condition is caused by a severe reaction to infections from bacteria, viruses, or fungus.  · Based on the cause of your infection, you may be given an antibiotic, antiviral, or antifungal medicine.  · Treatment may also include IV fluids, breathing assistance, and kidney dialysis.  This information is not intended to replace advice given to you by your health care provider. Make sure you discuss any questions you have with your health care provider.  Document Released: 09/15/2004 Document Revised: 07/26/2019 Document Reviewed: 07/26/2019  [employer] Patient Education © 2020 [employer]      · Is patient discharged on Warfarin / Coumadin?   No     Depression / Suicide Risk    As you are discharged from this [facility] facility, it is important to learn how to keep safe from harming yourself.    Recognize the warning signs:  · Abrupt changes in personality, positive or negative- including increase in energy   · Giving away possessions  · Change in eating patterns- significant weight changes-  positive or negative  · Change in sleeping patterns- unable to sleep or sleeping all the time   · Unwillingness or inability to communicate  · Depression  · Unusual sadness, discouragement and loneliness  · Talk of wanting to die  · Neglect of personal appearance   · Rebelliousness- reckless behavior  · Withdrawal from people/activities they love  · Confusion- inability to concentrate     If you or a loved one observes any of these behaviors or has concerns about self-harm, here's what you can do:  · Talk about it- your feelings and reasons for harming yourself  · Remove any means that you might use to hurt yourself (examples: pills, rope, extension cords, firearm)  · Get professional help from the community (Mental Health, Substance Abuse, psychological counseling)  · Do not be alone:Call your Safe Contact- someone whom you trust who will be there for you.  · Call your local CRISIS HOTLINE 102-8986 or 301-233-6880  · Call  your local Children's Mobile Crisis Response Team [place] (565) 259-7966 or www.[employer]  · Call the toll free National Suicide Prevention Hotlines   · National Suicide Prevention Lifeline 837-599-UISV (5992)  · National Hope Line Network 800-SUICIDE (982-7149)

## 2020-07-21 NOTE — ASSESSMENT & PLAN NOTE
Blood cultures negative to date.  Entry wound+  Responding to unasyn, switched to augmentin 7/21.  Wound care ok for discharge..  Patient will need outpatient wound care.

## 2020-07-21 NOTE — PROGRESS NOTES
Daily Progress Note:     Date of Service: 7/21/2020  Primary Team: [facility] Red Team    Attending: Natasha Maynard M.D.  Senior Resident: Garrison Cheung MD  Contact:  [phone]    Chief Complaint:   RLE Cellulitis    ID:   59 years old homeless male admitted due to RLE cellulitis     Subjective:   No overnight events. Patient is feeling stable today. No fever or chills, no sensation loss or weakness on LE. Comfortably lying on the bed. Patient is concerned about his van being towed, therefore wanted to leave.    Interval update:  - WBC wnl without any bands. Abx florecita. Blood cultures negative to date.  - Wound care changed dressings, much better in appearance.  - Wound care team saw patient and ok for discharge. Provided wound care supplies and informed about outpatient follow up for wound care.    Disposition: Home with outpatient wound care.    Consultants/Specialty:  Wound care    Review of Systems:  Review of Systems   Constitutional: Negative for chills and fever.   HENT: Negative for congestion and sore throat.    Eyes: Negative for pain and redness.   Respiratory: Negative for hemoptysis and shortness of breath.    Cardiovascular: Negative for chest pain and palpitations.   Gastrointestinal: Negative for nausea and vomiting.   Genitourinary: Negative for frequency and urgency.   Musculoskeletal: Negative for back pain and falls.   Neurological: Negative for dizziness, sensory change, weakness and headaches.   Endo/Heme/Allergies: Negative for polydipsia.   Psychiatric/Behavioral: Negative for depression. The patient is not nervous/anxious and does not have insomnia.      Objective Data:   Physical Exam:   Vitals:   Temp:  [36.2 °C (97.1 °F)-36.6 °C (97.8 °F)] 36.3 °C (97.4 °F)  Pulse:  [54-60] 58  Resp:  [17-20] 20  BP: (136-167)/(79-96) 136/85  SpO2:  [94 %-98 %] 94 %     Physical Exam  Vitals signs and nursing note reviewed.   Constitutional:       Appearance: Normal appearance.   HENT:      Head:  Normocephalic and atraumatic.      Right Ear: External ear normal.      Left Ear: External ear normal.      Nose: Nose normal. No rhinorrhea.      Mouth/Throat:      Mouth: Mucous membranes are moist.      Pharynx: Oropharynx is clear.   Eyes:      General: No scleral icterus.     Extraocular Movements: Extraocular movements intact.      Conjunctiva/sclera: Conjunctivae normal.      Pupils: Pupils are equal, round, and reactive to light.   Neck:      Musculoskeletal: Normal range of motion and neck supple.   Cardiovascular:      Rate and Rhythm: Normal rate and regular rhythm.      Pulses: Normal pulses.      Heart sounds: Normal heart sounds.   Pulmonary:      Effort: Pulmonary effort is normal.      Breath sounds: Normal breath sounds.   Abdominal:      General: Abdomen is flat. There is no distension.      Palpations: Abdomen is soft.      Tenderness: There is no abdominal tenderness.   Musculoskeletal:         General: Signs of injury (small laceration) present.      Right lower leg: No edema.      Left lower leg: No edema.      Comments: Wounds are wrapped. Distal pulses are palpable, no sensory deficit.   Skin:     General: Skin is warm and dry.      Capillary Refill: Capillary refill takes less than 2 seconds.   Neurological:      General: No focal deficit present.      Mental Status: He is alert and oriented to person, place, and time. Mental status is at baseline.   Psychiatric:         Mood and Affect: Mood normal.         Behavior: Behavior normal.     Labs:   Most Recent Labs:    Lab Results   Component Value Date/Time    WBC 6.0 07/21/2020 03:46 AM    RBC 5.24 07/21/2020 03:46 AM    HEMOGLOBIN 16.2 07/21/2020 03:46 AM    HEMATOCRIT 48.5 07/21/2020 03:46 AM    MCV 92.6 07/21/2020 03:46 AM    MCH 30.9 07/21/2020 03:46 AM    MCHC 33.4 (L) 07/21/2020 03:46 AM    MPV 10.0 07/21/2020 03:46 AM    NEUTSPOLYS 52.30 07/21/2020 03:46 AM    LYMPHOCYTES 36.50 07/21/2020 03:46 AM    MONOCYTES 6.70 07/21/2020 03:46 AM     EOSINOPHILS 3.30 07/21/2020 03:46 AM    BASOPHILS 0.70 07/21/2020 03:46 AM      Lab Results   Component Value Date/Time    SODIUM 134 (L) 07/21/2020 03:46 AM    POTASSIUM 4.1 07/21/2020 03:46 AM    CHLORIDE 101 07/21/2020 03:46 AM    CO2 22 07/21/2020 03:46 AM    GLUCOSE 107 (H) 07/21/2020 03:46 AM    BUN 13 07/21/2020 03:46 AM    CREATININE 0.87 07/21/2020 03:46 AM      Lab Results   Component Value Date/Time    ALTSGPT 34 07/21/2020 03:46 AM    ASTSGOT 32 07/21/2020 03:46 AM    ALKPHOSPHAT 103 (H) 07/21/2020 03:46 AM    TBILIRUBIN 0.4 07/21/2020 03:46 AM    LIPASE 26 10/28/2017 08:55 AM    ALBUMIN 3.4 07/21/2020 03:46 AM    GLOBULIN 3.5 07/21/2020 03:46 AM     Imaging:   CT-EXTREMITY, LOWER WITH RIGHT   Final Result         1.  Changes of cellulitis, no focal enhancing abscess identified.   2.  Right inguinal adenopathy   3.  Retracted versus cryptorchid right testicle, correlate with exam. Urologic referral as clinically age-appropriate   4.  Diverticulosis      DX-CHEST-PORTABLE (1 VIEW)   Final Result         1.  No acute cardiopulmonary disease.        * Lower extremity cellulitis- (present on admission)  Assessment & Plan  Blood cultures negative to date.  Entry wound+  Responding to unasyn, switched to augmentin 7/21.  Wound care ok for discharge..  Patient will need outpatient wound care.    Sepsis (HCC)  Assessment & Plan  Sepsis resolved.  This is Sepsis Present on admission  SIRS criteria identified on my evaluation include: Fever, with temperature greater than 101 deg F, Tachycardia, with heart rate greater than 90 BPM, Tachypnea, with respirations greater than 20 per minute and Leukocytosis, with WBC greater than 12,000  Source is RLE cellulitis  Sepsis protocol initiated  Fluid resuscitation ordered per protocol  IV antibiotics as appropriate for source of sepsis  While organ dysfunction may be noted elsewhere in this problem list or in the chart, degree of organ dysfunction does not meet CMS  criteria for severe sepsis  CT right lower extremity shows diffuse subcutaneous edema below the knee and is general dryness negative for drainable abscess  Blood Cx NGTD    Type 2 diabetes mellitus with skin complication (HCC)  Assessment & Plan  Hemoglobin A1c 6.9  Contributing to poor RLE wound healing  Symptomatic with polydypsia, polyuria, and polyphagia w/ positive family history    - Metformin 500mg BID  - Diabetes education  - Outpatient follow up with PCP    Asymptomatic microscopic hematuria  Assessment & Plan  Abnormal UA w/ 2-5 RBCs   No protein  Consistent w/ nonglomerular hematuria  - Outpatient follow up with PCP    Tobacco abuse  Assessment & Plan  3/4 to 1 ppd for 40+ years   - Tobacco cessation counseling provided  - Low dose CT chest lung cancer screening per PCP outpatient    Homelessness  Assessment & Plan  Lives in van  - social work following    Undescended right testicle  Assessment & Plan  CT RLE noted for right testicle in right inguinal canal suggesting retracted versus cryptorchid  Not urological complaints  - Outpatient urologic follow up

## 2020-07-21 NOTE — PROGRESS NOTES
Pt dc'd in stable and satisfactory condition. IV and monitor removed; monitor room notified. Pt left unit via wheelchair with RN. Personal belongings with pt when leaving unit. Pt given discharge instructions prior to leaving unit including where to  prescriptions and when to follow-up; verbalizes understanding. Copy of discharge instructions with pt and in the chart.

## 2020-07-21 NOTE — PROGRESS NOTES
Report received from day shift RN. Pt is in bed in lowest locked position with bed side table and call light within reach. Assessment performed. No further needs stated at this time. Pt is A&Ox4. RA. Hourly rounding in place.

## 2020-07-21 NOTE — DISCHARGE SUMMARY
Discharge Summary    Date of Admission: 7/17/2020  Date of Discharge: 7/21/2020  Discharging Attending: Natasha Maynard MD  Discharging Senior Resident: Dr. Cheung    CHIEF COMPLAINT ON ADMISSION  Chief Complaint   Patient presents with   • Fever   • N/V       Reason for Admission  Right lower extremity cellulitis.    Admission Date  7/17/2020    CODE STATUS  DNAR/DNI    HPI & HOSPITAL COURSE  This is a 59 y.o.  Homeless male living in a van, history notable for tobacco use admitted to the hospital sepsis secondary to right lower extremity cellulitis. He was presented with fever and right lower leg pain which was started after having trauma to his leg 3 months ago while he was working on his friend's truck. It was started with little erythema and worsened over time, eventually had purulent fluid draining. He was treated for the same reason with oral antibiotics before admission which he can't recall the names of antibiotics. On admission he meet SIRS criteria with temp and tachycardia as well as WBC count therefore started on sepsis protocol. The patient was given IVF in sepsis dose, and started on IV unasyn and obtained CT of lower extremities which did not reveal any gas or drainable abscess and was consistent with cellulitis. Blood cultures were obtained, no bacteria was isolated. Patient was treated with IV Unasyn thought the hospital stay and received wound care. Patient responded IV unasyn very well and WBC count consistently trended down. Unasyn switched to oral Augmentin and provided for total of 5 days for outpatient. The wound care team informed patient about wound care, the patient understood and exhibit good learning. We have discussed that the patient will need one more wound care appointment outpatient. Patient understood and agreed.    Patient initially presented with hyperglycemia, therefore A1c level was obtained, was 6.9. Patient was diagnosed with diabetes for the first time. He was started  on metformin 500 bid, and we discussed about diabetic foot care and need for ophthalmology appointment as well as primary care visit for investigation for complications. Patient understood and agreed. No complications of diabetes noted.    On UA, patient was found to have microscopic hematuria. Patient was asymptomatic and there were no findings for intrarenal or glomerular pathology. I have informed patient about risks of underlying malignancy, and that this needs to be followed up by his primary care physician. Since the patient does not have PCP, he will need to schedule one. Patient understood and agreed.     Therefore, he is discharged in fair and stable condition to home with close outpatient follow-up.    The patient met 2-midnight criteria for an inpatient stay at the time of discharge.    PHYSICAL EXAM ON DISCHARGE  Temp:  [36.2 °C (97.1 °F)-36.6 °C (97.8 °F)] 36.3 °C (97.4 °F)  Pulse:  [54-60] 58  Resp:  [17-20] 20  BP: (136-167)/(79-96) 136/85  SpO2:  [94 %-98 %] 94 %    Physical Exam  Constitutional:       Appearance: Normal appearance.   HENT:      Right Ear: External ear normal.      Left Ear: External ear normal.      Nose: No rhinorrhea.      Mouth/Throat:      Mouth: Mucous membranes are moist.      Pharynx: No posterior oropharyngeal erythema.   Eyes:      General: No scleral icterus.     Extraocular Movements: Extraocular movements intact.   Neck:      Musculoskeletal: Neck supple.   Cardiovascular:      Rate and Rhythm: Normal rate and regular rhythm.   Pulmonary:      Effort: Pulmonary effort is normal.      Breath sounds: Normal breath sounds. No wheezing or rales.   Abdominal:      General: Abdomen is flat. There is no distension.      Palpations: Abdomen is soft.      Tenderness: There is no right CVA tenderness or left CVA tenderness.   Musculoskeletal:         General: No swelling.      Right lower leg: No edema.      Left lower leg: No edema.   Skin:     Capillary Refill: Capillary refill  takes less than 2 seconds.      Coloration: Skin is not jaundiced.      Findings: No rash.      Comments: Wound with laceration on right medial lower extremity, much better than initial presentation. No discharge is visible. Wounds are wrapped carefully. Please see wound care note for further details.   Neurological:      General: No focal deficit present.      Mental Status: He is alert and oriented to person, place, and time. Mental status is at baseline.   Psychiatric:         Mood and Affect: Mood normal.         Discharge Date  7/21/2020    FOLLOW UP ITEMS POST DISCHARGE  Diabetes mellitus, new diagnosis.  Wound care and cellulitis.  Microscopic hematuria  Tobacco use, needs screening.      DISCHARGE DIAGNOSES  Principal Problem:    Lower extremity cellulitis POA: Yes  Active Problems:    Sepsis (HCC) POA: Unknown    Type 2 diabetes mellitus with skin complication (HCC) POA: Unknown    Undescended right testicle POA: Unknown    Homelessness POA: Unknown    Tobacco abuse POA: Unknown    Asymptomatic microscopic hematuria POA: Unknown  Resolved Problems:    * No resolved hospital problems. *      FOLLOW UP  No future appointments.  [facility]  [address] [id number] [phone]  Schedule an appointment as soon as possible for a visit  For wound re-check      MEDICATIONS ON DISCHARGE     Medication List      START taking these medications      Instructions   acetaminophen 500 MG Tabs  Commonly known as:  TYLENOL   Take 2 Tabs by mouth 3 times a day.  Dose:  1,000 mg     amoxicillin-clavulanate 875-125 MG Tabs  Commonly known as:  AUGMENTIN   Take 1 Tab by mouth every 12 hours for 5 days.  Dose:  1 Tab     metFORMIN 500 MG Tabs  Commonly known as:  GLUCOPHAGE   Take 1 Tab by mouth 2 times a day, with meals.  Dose:  500 mg            Allergies  No Known Allergies    DIET  Orders Placed This Encounter   Procedures   • Diet Order Diabetic     Standing Status:   Standing     Number of  Occurrences:   1     Order Specific Question:   Diet:     Answer:   Diabetic [3]       ACTIVITY  As tolerated.  Weight bearing as tolerated    CONSULTATIONS  None    PROCEDURES  None

## 2020-07-21 NOTE — WOUND TEAM
Received another consult for wound to pt's RLE. Pt has been by wound team who placed drsg. Is being followed by [facility]. Scheduled for next drsg change on 7/23. Wound team will see pt then.

## 2020-07-21 NOTE — CARE PLAN
Problem: Communication  Goal: The ability to communicate needs accurately and effectively will improve  7/21/2020 1601 by Tawny Moore R.N.  Outcome: MET  7/21/2020 0949 by Tawny Moore R.N.  Outcome: PROGRESSING AS EXPECTED     Problem: Safety  Goal: Will remain free from injury  Outcome: MET  Goal: Will remain free from falls  7/21/2020 1601 by Tawny Moore R.N.  Outcome: MET  7/21/2020 0949 by Tawny Moore R.N.  Outcome: PROGRESSING AS EXPECTED     Problem: Infection  Goal: Will remain free from infection  7/21/2020 1601 by Tawny Moore R.N.  Outcome: MET  7/21/2020 0949 by Tawny Moore R.N.  Outcome: PROGRESSING AS EXPECTED     Problem: Venous Thromboembolism (VTW)/Deep Vein Thrombosis (DVT) Prevention:  Goal: Patient will participate in Venous Thrombosis (VTE)/Deep Vein Thrombosis (DVT)Prevention Measures  Outcome: MET     Problem: Bowel/Gastric:  Goal: Normal bowel function is maintained or improved  Outcome: MET  Goal: Will not experience complications related to bowel motility  Outcome: MET     Problem: Knowledge Deficit  Goal: Knowledge of disease process/condition, treatment plan, diagnostic tests, and medications will improve  Outcome: MET  Goal: Knowledge of the prescribed therapeutic regimen will improve  Outcome: MET     Problem: Discharge Barriers/Planning  Goal: Patient's continuum of care needs will be met  Outcome: MET     Problem: Respiratory:  Goal: Respiratory status will improve  Outcome: MET     Problem: Fluid Volume:  Goal: Will maintain balanced intake and output  Outcome: MET     Problem: Pain Management  Goal: Pain level will decrease to patient's comfort goal  Outcome: MET

## 2020-07-21 NOTE — WOUND TEAM
This wound RN was stopped by bedside RN regarding patient being discharged with an unna boot in place. Patient is homeless and being followed by wound team and previously seen by [facility]. This wound RN spoke to [facility] JAIME Morelos regarding patient discharge and dressing care. Patient unna boot removed, cleansed entire leg with warm moist wash cloths and cleansed the wound with approved wound cleanser and gauze. RLE medial lower leg with small wound, pink, red, clean, minimal to no drainage, no slough. Applied hydrofera blue and adhesive foam to the area. Applied tubi  size E to RLE to apply mild compression. Prescriptions from [facility] for shoes given to patient. Patient to follow up at outpatient wound clinic. Updated bedside RN Tawny. Extra supplies given to patient, patient educated on dressing care and management and how to change once discharged, verbalized understanding of education. Orders updated. No further inpatient follow up at this time.    room air

## 2020-07-23 LAB
BACTERIA BLD CULT: NORMAL
SIGNIFICANT IND 70042: NORMAL
SITE SITE: NORMAL
SOURCE SOURCE: NORMAL

## 2022-01-19 ENCOUNTER — HOSPITAL ENCOUNTER (EMERGENCY)
Facility: MEDICAL CENTER | Age: 62
End: 2022-01-20
Attending: EMERGENCY MEDICINE

## 2022-01-19 ENCOUNTER — APPOINTMENT (OUTPATIENT)
Dept: RADIOLOGY | Facility: MEDICAL CENTER | Age: 62
End: 2022-01-19
Attending: EMERGENCY MEDICINE

## 2022-01-19 DIAGNOSIS — L24.A9 WOUND DRAINAGE: ICD-10-CM

## 2022-01-19 DIAGNOSIS — L03.115 CELLULITIS OF RIGHT LOWER EXTREMITY: ICD-10-CM

## 2022-01-19 LAB
ALBUMIN SERPL BCP-MCNC: 4.2 G/DL (ref 3.2–4.9)
ALBUMIN/GLOB SERPL: 1 G/DL
ALP SERPL-CCNC: 150 U/L (ref 30–99)
ALT SERPL-CCNC: 23 U/L (ref 2–50)
ANION GAP SERPL CALC-SCNC: 13 MMOL/L (ref 7–16)
AST SERPL-CCNC: 22 U/L (ref 12–45)
BASOPHILS # BLD AUTO: 0.4 % (ref 0–1.8)
BASOPHILS # BLD: 0.03 K/UL (ref 0–0.12)
BILIRUB SERPL-MCNC: 0.3 MG/DL (ref 0.1–1.5)
BUN SERPL-MCNC: 19 MG/DL (ref 8–22)
CALCIUM SERPL-MCNC: 10.3 MG/DL (ref 8.5–10.5)
CHLORIDE SERPL-SCNC: 100 MMOL/L (ref 96–112)
CO2 SERPL-SCNC: 24 MMOL/L (ref 20–33)
CREAT SERPL-MCNC: 0.97 MG/DL (ref 0.5–1.4)
EOSINOPHIL # BLD AUTO: 0.16 K/UL (ref 0–0.51)
EOSINOPHIL NFR BLD: 2 % (ref 0–6.9)
ERYTHROCYTE [DISTWIDTH] IN BLOOD BY AUTOMATED COUNT: 47 FL (ref 35.9–50)
ERYTHROCYTE [SEDIMENTATION RATE] IN BLOOD BY WESTERGREN METHOD: 6 MM/HOUR (ref 0–20)
GLOBULIN SER CALC-MCNC: 4.1 G/DL (ref 1.9–3.5)
GLUCOSE SERPL-MCNC: 106 MG/DL (ref 65–99)
HCT VFR BLD AUTO: 52.5 % (ref 42–52)
HGB BLD-MCNC: 17.2 G/DL (ref 14–18)
IMM GRANULOCYTES # BLD AUTO: 0.04 K/UL (ref 0–0.11)
IMM GRANULOCYTES NFR BLD AUTO: 0.5 % (ref 0–0.9)
LYMPHOCYTES # BLD AUTO: 2.37 K/UL (ref 1–4.8)
LYMPHOCYTES NFR BLD: 30.3 % (ref 22–41)
MCH RBC QN AUTO: 31 PG (ref 27–33)
MCHC RBC AUTO-ENTMCNC: 32.8 G/DL (ref 33.7–35.3)
MCV RBC AUTO: 94.6 FL (ref 81.4–97.8)
MONOCYTES # BLD AUTO: 0.63 K/UL (ref 0–0.85)
MONOCYTES NFR BLD AUTO: 8.1 % (ref 0–13.4)
NEUTROPHILS # BLD AUTO: 4.58 K/UL (ref 1.82–7.42)
NEUTROPHILS NFR BLD: 58.7 % (ref 44–72)
NRBC # BLD AUTO: 0 K/UL
NRBC BLD-RTO: 0 /100 WBC
PLATELET # BLD AUTO: 279 K/UL (ref 164–446)
PMV BLD AUTO: 10 FL (ref 9–12.9)
POTASSIUM SERPL-SCNC: 4.5 MMOL/L (ref 3.6–5.5)
PROT SERPL-MCNC: 8.3 G/DL (ref 6–8.2)
RBC # BLD AUTO: 5.55 M/UL (ref 4.7–6.1)
SODIUM SERPL-SCNC: 137 MMOL/L (ref 135–145)
WBC # BLD AUTO: 7.8 K/UL (ref 4.8–10.8)

## 2022-01-19 PROCEDURE — 700111 HCHG RX REV CODE 636 W/ 250 OVERRIDE (IP): Performed by: EMERGENCY MEDICINE

## 2022-01-19 PROCEDURE — 80053 COMPREHEN METABOLIC PANEL: CPT

## 2022-01-19 PROCEDURE — 73590 X-RAY EXAM OF LOWER LEG: CPT | Mod: RT

## 2022-01-19 PROCEDURE — 99284 EMERGENCY DEPT VISIT MOD MDM: CPT

## 2022-01-19 PROCEDURE — 85652 RBC SED RATE AUTOMATED: CPT

## 2022-01-19 PROCEDURE — 85025 COMPLETE CBC W/AUTO DIFF WBC: CPT

## 2022-01-19 PROCEDURE — 96374 THER/PROPH/DIAG INJ IV PUSH: CPT

## 2022-01-19 RX ORDER — CEPHALEXIN 500 MG/1
500 TABLET ORAL 3 TIMES DAILY
Qty: 21 TABLET | Refills: 0 | Status: SHIPPED | OUTPATIENT
Start: 2022-01-19 | End: 2022-01-19 | Stop reason: SDUPTHER

## 2022-01-19 RX ORDER — CEPHALEXIN 500 MG/1
500 TABLET ORAL 3 TIMES DAILY
Qty: 21 TABLET | Refills: 0 | Status: SHIPPED | OUTPATIENT
Start: 2022-01-19

## 2022-01-19 RX ADMIN — CEFAZOLIN 2 G: 10 INJECTION, POWDER, FOR SOLUTION INTRAVENOUS at 22:52

## 2022-01-19 ASSESSMENT — FIBROSIS 4 INDEX: FIB4 SCORE: 1.83

## 2022-01-19 NOTE — ED TRIAGE NOTES
"Chief Complaint   Patient presents with   • Wound Check     R leg, just reopened wound about 3 weeks ago     Pt ambulatory to triage for above complaint. Pt has open wound on R shin. Pt denies pain but reports \"leaking\" from the area for awhile. Wound has been present for a few months but 3 weeks ago reopened.   "

## 2022-01-20 VITALS
TEMPERATURE: 98 F | HEIGHT: 75 IN | RESPIRATION RATE: 18 BRPM | SYSTOLIC BLOOD PRESSURE: 158 MMHG | BODY MASS INDEX: 31.25 KG/M2 | DIASTOLIC BLOOD PRESSURE: 84 MMHG | HEART RATE: 80 BPM | OXYGEN SATURATION: 95 % | WEIGHT: 251.32 LBS

## 2022-01-20 PROCEDURE — 93971 EXTREMITY STUDY: CPT | Mod: RT

## 2022-01-20 NOTE — ED NOTES
Pt left room before discharge paperwork could be presented. No education on prescription  was provided.

## 2022-01-20 NOTE — ED NOTES
Film room called and informed this nurse that they called in the ultrasound tech. Procedure may be delayed due to other pending requests.

## 2022-01-20 NOTE — ED PROVIDER NOTES
"ED Provider Note    CHIEF COMPLAINT  Chief Complaint   Patient presents with   • Wound Check     R leg, just reopened wound about 3 weeks ago       HPI  Timothy Dahl is a 61 y.o. male who presents with open wounds in his right leg with crusting and discharge.  No fever or flulike systems nausea or body aches.  He does have diabetes but no other immune compromise.  He has a prior injury to that leg.  No DVT or PE history.  He does have some swelling and discomfort in the calf.  No chest pain or shortness of breath.    REVIEW OF SYSTEMS  Pertinent positives include: Right leg edema, open wound, discharge, crusting, calf pain.  Pertinent negatives include: Pain, shortness of breath, abdominal pain, vomiting, diarrhea, COVID symptoms.  10+ systems reviewed and negative.      PAST MEDICAL HISTORY  Prior right leg injury  Right leg cellulitis, sepsis, and diabetes diagnosis 2020    SOCIAL HISTORY  Social History     Tobacco Use   • Smoking status: Current Every Day Smoker     Packs/day: 0.50     Types: Cigarettes   • Smokeless tobacco: Never Used   Substance Use Topics   • Alcohol use: No   • Drug use: No     Social History     Substance and Sexual Activity   Drug Use No       SURGICAL HISTORY  Past Surgical History:   Procedure Laterality Date   • OTHER ORTHOPEDIC SURGERY      hand, knee       CURRENT MEDICATIONS    Current Outpatient Medications   Medication Sig Dispense Refill   • acetaminophen (TYLENOL) 500 MG Tab Take 2 Tabs by mouth 3 times a day. 30 Tab 0   • metFORMIN (GLUCOPHAGE) 500 MG Tab Take 1 Tab by mouth 2 times a day, with meals. 60 Tab 0       ALLERGIES  No Known Allergies    PHYSICAL EXAM  VITAL SIGNS: /87   Pulse 75   Temp 36.6 °C (97.9 °F) (Temporal)   Resp 20   Ht 1.905 m (6' 3\")   Wt 114 kg (251 lb 5.2 oz)   SpO2 95%   BMI 31.41 kg/m²   Reviewed and elevated blood pressure, afebrile  Constitutional: Well developed, Well nourished, appearing.  HENT: Normocephalic, atraumatic, " bilateral external ears normal, Wearing mask.   Eyes: PERRLA, conjunctiva pink, no scleral icterus.   Cardiovascular: Regular S1-S2 without murmur, rub, gallop.  No dependent edema or calf tenderness.  Respiratory: No rales, rhonchi, wheeze or cough.  Gastrointestinal: Soft, nontender, nondistended, no organomegaly.  Skin: Hyper pigmentation right leg with medial wounds with discharge dried on the skin of the honey color.  No pus.  Mild edema of calf but no focal tenderness or severe tenderness to suggest necrotizing fasciitis.  No fluctuance.  Genitourinary:  No costovertebral angle tenderness.   Neurologic: Alert & oriented x 3, cranial nerves 2-12 intact by passive exam.  No focal deficit noted.  Psychiatric: Affect normal, Judgment normal, Mood normal.     DIFFERENTIAL DIAGNOSIS:  Colitis, doubt necrotizing fasciitis, doubt abscess, sepsis, impetigo, DVT.    RADIOLOGY/PROCEDURES  US-EXTREMITY VENOUS LOWER UNILAT RIGHT   Final Result      DX-TIBIA AND FIBULA RIGHT   Final Result      Soft tissue swelling without acute osseous abnormality.      Ultrasound negative for DVT    LABORATORY:  Results for orders placed or performed during the hospital encounter of 01/19/22   CBC WITH DIFFERENTIAL   Result Value Ref Range    WBC 7.8 4.8 - 10.8 K/uL    RBC 5.55 4.70 - 6.10 M/uL    Hemoglobin 17.2 14.0 - 18.0 g/dL    Hematocrit 52.5 (H) 42.0 - 52.0 %    MCV 94.6 81.4 - 97.8 fL    MCH 31.0 27.0 - 33.0 pg    MCHC 32.8 (L) 33.7 - 35.3 g/dL    RDW 47.0 35.9 - 50.0 fL    Platelet Count 279 164 - 446 K/uL    MPV 10.0 9.0 - 12.9 fL    Neutrophils-Polys 58.70 44.00 - 72.00 %    Lymphocytes 30.30 22.00 - 41.00 %    Monocytes 8.10 0.00 - 13.40 %    Eosinophils 2.00 0.00 - 6.90 %    Basophils 0.40 0.00 - 1.80 %    Immature Granulocytes 0.50 0.00 - 0.90 %    Nucleated RBC 0.00 /100 WBC    Neutrophils (Absolute) 4.58 1.82 - 7.42 K/uL    Lymphs (Absolute) 2.37 1.00 - 4.80 K/uL    Monos (Absolute) 0.63 0.00 - 0.85 K/uL    Eos (Absolute)  0.16 0.00 - 0.51 K/uL    Baso (Absolute) 0.03 0.00 - 0.12 K/uL    Immature Granulocytes (abs) 0.04 0.00 - 0.11 K/uL    NRBC (Absolute) 0.00 K/uL   Comp Metabolic Panel   Result Value Ref Range    Sodium 137 135 - 145 mmol/L    Potassium 4.5 3.6 - 5.5 mmol/L    Chloride 100 96 - 112 mmol/L    Co2 24 20 - 33 mmol/L    Anion Gap 13.0 7.0 - 16.0    Glucose 106 (H) 65 - 99 mg/dL    Bun 19 8 - 22 mg/dL    Creatinine 0.97 0.50 - 1.40 mg/dL    Calcium 10.3 8.5 - 10.5 mg/dL    AST(SGOT) 22 12 - 45 U/L    ALT(SGPT) 23 2 - 50 U/L    Alkaline Phosphatase 150 (H) 30 - 99 U/L    Total Bilirubin 0.3 0.1 - 1.5 mg/dL    Albumin 4.2 3.2 - 4.9 g/dL    Total Protein 8.3 (H) 6.0 - 8.2 g/dL    Globulin 4.1 (H) 1.9 - 3.5 g/dL    A-G Ratio 1.0 g/dL   Sed Rate   Result Value Ref Range    Sed Rate [facility] 6 0 - 20 mm/hour       INTERVENTIONS:  Medications   ceFAZolin (Ancef) IV syringe 2 g (has no administration in time range)       COURSE & MEDICAL DECISION MAKING  This patient with well-controlled diabetes presents with an early right leg cellulitis or impetigo without evidence of sepsis, necrotizing fasciitis or abscess.  He is still a good candidate for an outpatient trial of antibiotics..      PLAN:  cephalexin  Leg elevation  Cellulitis handout given  Return for spreading more than 2 inches, red streaks, fever beyond 2 days, vomiting, dizziness or ill appearance    [facility]  [address]  [facility] [address]  [phone]  Schedule an appointment as soon as possible for a visit         CONDITION: Stable.    FINAL IMPRESSION  1. Cellulitis of right lower extremity    2. Wound drainage    3.      History of type 2 diabetes      Electronically signed by: Juvenal Gamble M.D., 1/19/2022 10:33 PM

## 2022-01-20 NOTE — ED TRIAGE NOTES
PT was [email or web address]. Apologized for wait times and thanked them for their patience.  Advised them to please let us know if anything changes.

## 2022-01-20 NOTE — DISCHARGE INSTRUCTIONS
Cellulitis    Start antibiotics Uv morning.  Elevate the affected area above the heart if possible.  Ended to daily with antibiotic ointment.  Call the wound care clinic for follow-up.  Return for redness spreading more than 1-2 inches, red streaks, ill appearance or formation of abscess.  Keep any open areas bandaged to prevent contagion.  See a doctor if not better 2-3 days.    You had an elevated or high normal blood pressure reading today.  This does not necessarily mean you have hypertension.  Please followup with your/a primary physician for comprehensive blood pressure evaluation.  BP Readings from Last 3 Encounters:   01/19/22 160/87   07/21/20 136/85   05/24/20 139/78   .      Your caregiver has diagnosed you or your child as having cellulitis. Cellulitis is an infection of the skin and the tissue beneath it. The area is typically red and tender. It is caused by bacteria (germs) (usually staph or strep) that enter the body through cuts or sores. Cellulitis most commonly occurs in the arms and/or lower legs.      HOME CARE INSTRUCTIONS  If you are given a prescription for antibiotics (medications which kill germs), take as directed until finished.  If the infection is on the arm or leg, keep the limb elevated as able.  Use a heating pad several times per day to relieve pain and encourage healing. Do not sleep with heating pad. If you are diabetic, do not use a heating pad unless instructed to do so.  See your caregiver for a recheck of the infected site in 2 days, or sooner if problems arise.  Use acetaminophen (Tylenol®) or ibuprofen (Advil® or Motrin®) as needed for relief of fever, pain and discomfort.    seek medical care if:  An oral temperature above 102° F (38.9° C) develops, or as your caregiver suggests, not controlled by medication.  The area of redness is spreading, there are red streaks coming from the infected site, or if a part of the infection begins to turn dark in color.  The joint or  bone underneath the infected skin becomes painful after the skin has healed.  The infection returns in the same or another area after it seems to have gone away.  A boil or bump swells up. This may be an abscess.  New, unexplained symptoms (problems) such as pain or fever develop.    seek immediate medical care if:   You or your child feel drowsy or lethargic.  There is vomiting, diarrhea, or generalized malaise with muscle aches and pains.    Document Released: 09/27/2006  Document Re-Released: 01/29/2008  [email or web address]® Patient Information ©2008 [employer].

## 2022-02-05 ENCOUNTER — HOSPITAL ENCOUNTER (EMERGENCY)
Facility: MEDICAL CENTER | Age: 62
End: 2022-02-05
Attending: EMERGENCY MEDICINE

## 2022-02-05 VITALS
OXYGEN SATURATION: 95 % | HEIGHT: 75 IN | RESPIRATION RATE: 15 BRPM | DIASTOLIC BLOOD PRESSURE: 72 MMHG | BODY MASS INDEX: 31.21 KG/M2 | HEART RATE: 83 BPM | SYSTOLIC BLOOD PRESSURE: 127 MMHG | WEIGHT: 251 LBS | TEMPERATURE: 97.6 F

## 2022-02-05 DIAGNOSIS — Z00.8 MEDICAL CLEARANCE FOR INCARCERATION: ICD-10-CM

## 2022-02-05 DIAGNOSIS — L03.115 CELLULITIS OF RIGHT LOWER EXTREMITY: ICD-10-CM

## 2022-02-05 PROCEDURE — 99282 EMERGENCY DEPT VISIT SF MDM: CPT

## 2022-02-05 RX ORDER — CEPHALEXIN 500 MG/1
500 CAPSULE ORAL 3 TIMES DAILY
Qty: 30 CAPSULE | Refills: 0 | Status: SHIPPED | OUTPATIENT
Start: 2022-02-05

## 2022-02-05 ASSESSMENT — FIBROSIS 4 INDEX: FIB4 SCORE: 1.02
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
## 2022-02-06 NOTE — ED NOTES
Discharge instructions given to pt. Prescriptions handed to patient. Pt educated, verbalizes understanding. All belongings accounted for. Pt ambulated out of ED with officers.